# Patient Record
Sex: FEMALE | Race: WHITE | NOT HISPANIC OR LATINO | Employment: STUDENT | URBAN - METROPOLITAN AREA
[De-identification: names, ages, dates, MRNs, and addresses within clinical notes are randomized per-mention and may not be internally consistent; named-entity substitution may affect disease eponyms.]

---

## 2020-09-21 ENCOUNTER — HOSPITAL ENCOUNTER (OUTPATIENT)
Dept: GENERAL RADIOLOGY | Facility: HOSPITAL | Age: 20
Discharge: HOME | End: 2020-09-21
Admitting: FAMILY MEDICINE

## 2020-09-21 ENCOUNTER — OFFICE VISIT (OUTPATIENT)
Dept: FAMILY MEDICINE CLINIC | Facility: CLINIC | Age: 20
End: 2020-09-21

## 2020-09-21 VITALS
RESPIRATION RATE: 15 BRPM | DIASTOLIC BLOOD PRESSURE: 64 MMHG | HEART RATE: 73 BPM | TEMPERATURE: 98.6 F | BODY MASS INDEX: 20.79 KG/M2 | OXYGEN SATURATION: 98 % | SYSTOLIC BLOOD PRESSURE: 112 MMHG | WEIGHT: 137.2 LBS | HEIGHT: 68 IN

## 2020-09-21 DIAGNOSIS — R11.0 NAUSEA: ICD-10-CM

## 2020-09-21 DIAGNOSIS — R19.7 DIARRHEA, UNSPECIFIED TYPE: ICD-10-CM

## 2020-09-21 DIAGNOSIS — R10.30 LOWER ABDOMINAL PAIN: Primary | ICD-10-CM

## 2020-09-21 LAB
B-HCG UR QL: NEGATIVE
BILIRUB BLD-MCNC: NEGATIVE MG/DL
CLARITY, POC: CLEAR
COLOR UR: NORMAL
EXPIRATION DATE: NORMAL
GLUCOSE UR STRIP-MCNC: NEGATIVE MG/DL
INTERNAL NEGATIVE CONTROL: NORMAL
INTERNAL POSITIVE CONTROL: POSITIVE
KETONES UR QL: NEGATIVE
LEUKOCYTE EST, POC: NEGATIVE
Lab: NORMAL
Lab: NORMAL
NITRITE UR-MCNC: NEGATIVE MG/ML
PH UR: 6 [PH] (ref 5–8)
PROT UR STRIP-MCNC: NEGATIVE MG/DL
RBC # UR STRIP: NEGATIVE /UL
SP GR UR: 1.03 (ref 1–1.03)
UROBILINOGEN UR QL: NORMAL

## 2020-09-21 PROCEDURE — 81002 URINALYSIS NONAUTO W/O SCOPE: CPT | Performed by: FAMILY MEDICINE

## 2020-09-21 PROCEDURE — 99203 OFFICE O/P NEW LOW 30 MIN: CPT | Performed by: FAMILY MEDICINE

## 2020-09-21 PROCEDURE — 74018 RADEX ABDOMEN 1 VIEW: CPT

## 2020-09-21 PROCEDURE — 81025 URINE PREGNANCY TEST: CPT | Performed by: FAMILY MEDICINE

## 2020-09-21 NOTE — PATIENT INSTRUCTIONS
Go to the nearest ER or return to clinic if symptoms worsen, fever/chill develop    Abdominal Pain, Adult  Many things can cause belly (abdominal) pain. Most times, belly pain is not dangerous. Many cases of belly pain can be watched and treated at home. Sometimes, though, belly pain is serious. Your doctor will try to find the cause of your belly pain.  Follow these instructions at home:    Medicines  · Take over-the-counter and prescription medicines only as told by your doctor.  · Do not take medicines that help you poop (laxatives) unless told by your doctor.  General instructions  · Watch your belly pain for any changes.  · Drink enough fluid to keep your pee (urine) pale yellow.  · Keep all follow-up visits as told by your doctor. This is important.  Contact a doctor if:  · Your belly pain changes or gets worse.  · You are not hungry, or you lose weight without trying.  · You are having trouble pooping (constipated) or have watery poop (diarrhea) for more than 2-3 days.  · You have pain when you pee or poop.  · Your belly pain wakes you up at night.  · Your pain gets worse with meals, after eating, or with certain foods.  · You are vomiting and cannot keep anything down.  · You have a fever.  · You have blood in your pee.  Get help right away if:  · Your pain does not go away as soon as your doctor says it should.  · You cannot stop vomiting.  · Your pain is only in areas of your belly, such as the right side or the left lower part of the belly.  · You have bloody or black poop, or poop that looks like tar.  · You have very bad pain, cramping, or bloating in your belly.  · You have signs of not having enough fluid or water in your body (dehydration), such as:  ? Dark pee, very little pee, or no pee.  ? Cracked lips.  ? Dry mouth.  ? Sunken eyes.  ? Sleepiness.  ? Weakness.  · You have trouble breathing or chest pain.  Summary  · Many cases of belly pain can be watched and treated at home.  · Watch your belly  pain for any changes.  · Take over-the-counter and prescription medicines only as told by your doctor.  · Contact a doctor if your belly pain changes or gets worse.  · Get help right away if you have very bad pain, cramping, or bloating in your belly.  This information is not intended to replace advice given to you by your health care provider. Make sure you discuss any questions you have with your health care provider.  Document Released: 06/05/2009 Document Revised: 04/27/2020 Document Reviewed: 04/27/2020  Elsevier Patient Education © 2020 [employer]

## 2020-09-21 NOTE — PROGRESS NOTES
Subjective   Savanah Keller is a 20 y.o. female.   Chief Complaint   Patient presents with   • Establish Care     Pt here to establish care, new to the area for [facility]   • Abdominal Pain     she c/o abdominal pain, nausea, diarrhea for several months, she says its happens intermittently and nearly every day     Abdominal Pain  This is a chronic problem. The current episode started more than 1 year ago. The onset quality is gradual. The problem occurs every several days. The problem has been unchanged. The pain is located in the RLQ and LLQ. The quality of the pain is aching, sharp and cramping. The abdominal pain does not radiate. Associated symptoms include diarrhea and nausea. Pertinent negatives include no dysuria, fever or frequency. Nothing aggravates the pain. The pain is relieved by bowel movements. Treatments tried: Pepto Bismol, anti-diarrheal  The treatment provided mild relief.   Doesn't have a BM daily, usually 2-3 days in between. When she does have a BM, it is diarrhea.   Stopped eating dairy, which helped symptoms initially. Reintroduced to her diet, didn't cause symptoms initially. Stopped eating dairy again, but hasn't helped.    Only occasion heartburn, after eating large meal and bending over.   LMP: 9/1/2020    The following portions of the patient's history were reviewed and updated as appropriate: allergies, current medications, past family history, past medical history, past social history, past surgical history and problem list.    Review of Systems   Constitutional: Negative for chills, fatigue and fever.   HENT: Negative.    Eyes: Negative.    Respiratory: Negative for cough, chest tightness and shortness of breath.    Cardiovascular: Negative for chest pain and palpitations.   Gastrointestinal: Positive for abdominal pain, diarrhea and nausea. Negative for blood in stool, GERD and indigestion.   Genitourinary: Negative for dysuria, frequency and urgency.   Skin: Negative for rash.    Neurological: Negative for light-headedness, headache and confusion.   Hematological: Negative for adenopathy. Does not bruise/bleed easily.   Psychiatric/Behavioral: Negative.        Objective   Physical Exam  Vitals signs and nursing note reviewed.   Constitutional:       Appearance: She is well-developed.   HENT:      Head: Normocephalic and atraumatic.      Right Ear: External ear normal.      Left Ear: External ear normal.      Nose: Nose normal.   Eyes:      Conjunctiva/sclera: Conjunctivae normal.   Neck:      Musculoskeletal: Normal range of motion and neck supple.   Cardiovascular:      Rate and Rhythm: Normal rate and regular rhythm.      Heart sounds: Normal heart sounds. No murmur.   Pulmonary:      Effort: Pulmonary effort is normal.      Breath sounds: Normal breath sounds.   Abdominal:      General: Bowel sounds are decreased.      Palpations: Abdomen is soft.      Tenderness: There is no abdominal tenderness. There is no guarding. Negative signs include Saenz's sign and McBurney's sign.   Musculoskeletal:         General: No deformity.   Skin:     General: Skin is warm and dry.   Neurological:      Mental Status: She is alert and oriented to person, place, and time.   Psychiatric:         Mood and Affect: Mood normal.         Behavior: Behavior normal.           Assessment/Plan   aSvanah was seen today for establish care and abdominal pain.    Diagnoses and all orders for this visit:    Lower abdominal pain  -     H. Pylori Breath Test - Breath, Lung  -     POC Urinalysis Dipstick  -     XR Abdomen KUB  -     POCT pregnancy, urine  -     CBC & Differential  -     Comprehensive Metabolic Panel    Diarrhea, unspecified type  -     H. Pylori Breath Test - Breath, Lung  -     POC Urinalysis Dipstick  -     XR Abdomen KUB  -     CBC & Differential  -     Comprehensive Metabolic Panel    Nausea  -     H. Pylori Breath Test - Breath, Lung  -     POC Urinalysis Dipstick  -     XR Abdomen KUB  -     POCT  pregnancy, urine  -     CBC & Differential  -     Comprehensive Metabolic Panel      UPT negative and UA normal.   Labs completed to evaluate abdominal pain, along with KUB.   Consider gastroenterology consult if symptoms persist and evaluation is negative.

## 2020-09-22 LAB
ALBUMIN SERPL-MCNC: 4.7 G/DL (ref 3.5–5.2)
ALBUMIN/GLOB SERPL: 2.2 G/DL
ALP SERPL-CCNC: 62 U/L (ref 39–117)
ALT SERPL-CCNC: 16 U/L (ref 1–33)
AST SERPL-CCNC: 15 U/L (ref 1–32)
BASOPHILS # BLD AUTO: 0.05 10*3/MM3 (ref 0–0.2)
BASOPHILS NFR BLD AUTO: 0.7 % (ref 0–1.5)
BILIRUB SERPL-MCNC: 0.2 MG/DL (ref 0–1.2)
BUN SERPL-MCNC: 11 MG/DL (ref 6–20)
BUN/CREAT SERPL: 14.9 (ref 7–25)
CALCIUM SERPL-MCNC: 9.3 MG/DL (ref 8.6–10.5)
CHLORIDE SERPL-SCNC: 101 MMOL/L (ref 98–107)
CO2 SERPL-SCNC: 26 MMOL/L (ref 22–29)
CREAT SERPL-MCNC: 0.74 MG/DL (ref 0.57–1)
EOSINOPHIL # BLD AUTO: 0.17 10*3/MM3 (ref 0–0.4)
EOSINOPHIL NFR BLD AUTO: 2.5 % (ref 0.3–6.2)
ERYTHROCYTE [DISTWIDTH] IN BLOOD BY AUTOMATED COUNT: 12.9 % (ref 12.3–15.4)
GLOBULIN SER CALC-MCNC: 2.1 GM/DL
GLUCOSE SERPL-MCNC: 82 MG/DL (ref 65–99)
HCT VFR BLD AUTO: 39 % (ref 34–46.6)
HGB BLD-MCNC: 13.4 G/DL (ref 12–15.9)
IMM GRANULOCYTES # BLD AUTO: 0.02 10*3/MM3 (ref 0–0.05)
IMM GRANULOCYTES NFR BLD AUTO: 0.3 % (ref 0–0.5)
LYMPHOCYTES # BLD AUTO: 1.69 10*3/MM3 (ref 0.7–3.1)
LYMPHOCYTES NFR BLD AUTO: 25 % (ref 19.6–45.3)
MCH RBC QN AUTO: 30.7 PG (ref 26.6–33)
MCHC RBC AUTO-ENTMCNC: 34.4 G/DL (ref 31.5–35.7)
MCV RBC AUTO: 89.2 FL (ref 79–97)
MONOCYTES # BLD AUTO: 0.49 10*3/MM3 (ref 0.1–0.9)
MONOCYTES NFR BLD AUTO: 7.2 % (ref 5–12)
NEUTROPHILS # BLD AUTO: 4.35 10*3/MM3 (ref 1.7–7)
NEUTROPHILS NFR BLD AUTO: 64.3 % (ref 42.7–76)
NRBC BLD AUTO-RTO: 0 /100 WBC (ref 0–0.2)
PLATELET # BLD AUTO: 220 10*3/MM3 (ref 140–450)
POTASSIUM SERPL-SCNC: 4.5 MMOL/L (ref 3.5–5.2)
PROT SERPL-MCNC: 6.8 G/DL (ref 6–8.5)
RBC # BLD AUTO: 4.37 10*6/MM3 (ref 3.77–5.28)
SODIUM SERPL-SCNC: 138 MMOL/L (ref 136–145)
UREA BREATH TEST QL: NEGATIVE
WBC # BLD AUTO: 6.77 10*3/MM3 (ref 3.4–10.8)

## 2020-09-22 RX ORDER — POLYETHYLENE GLYCOL 3350 17 G/17G
17 POWDER, FOR SOLUTION ORAL DAILY PRN
Qty: 507 G | Refills: 1 | Status: SHIPPED | OUTPATIENT
Start: 2020-09-22 | End: 2022-06-06

## 2021-02-04 ENCOUNTER — TELEPHONE (OUTPATIENT)
Dept: FAMILY MEDICINE CLINIC | Facility: CLINIC | Age: 21
End: 2021-02-04

## 2021-02-04 DIAGNOSIS — K59.00 CONSTIPATION, UNSPECIFIED CONSTIPATION TYPE: ICD-10-CM

## 2021-02-04 DIAGNOSIS — R10.30 LOWER ABDOMINAL PAIN: Primary | ICD-10-CM

## 2021-02-04 NOTE — TELEPHONE ENCOUNTER
Patient says her GI issue is not getting better. She states the Provider told her to call if not better and the provider would order a referral to GI for appt.     Please advise her.

## 2021-04-21 ENCOUNTER — OFFICE VISIT (OUTPATIENT)
Dept: GASTROENTEROLOGY | Facility: CLINIC | Age: 21
End: 2021-04-21

## 2021-04-21 VITALS
DIASTOLIC BLOOD PRESSURE: 77 MMHG | SYSTOLIC BLOOD PRESSURE: 115 MMHG | TEMPERATURE: 98.4 F | BODY MASS INDEX: 20.65 KG/M2 | HEART RATE: 68 BPM | WEIGHT: 135.8 LBS

## 2021-04-21 DIAGNOSIS — K58.2 IRRITABLE BOWEL SYNDROME WITH BOTH CONSTIPATION AND DIARRHEA: Primary | ICD-10-CM

## 2021-04-21 PROCEDURE — 99244 OFF/OP CNSLTJ NEW/EST MOD 40: CPT | Performed by: INTERNAL MEDICINE

## 2021-04-21 RX ORDER — MONTELUKAST SODIUM 10 MG/1
1 TABLET ORAL DAILY
COMMUNITY
Start: 2021-01-26 | End: 2022-06-06 | Stop reason: SDUPTHER

## 2021-04-21 NOTE — PROGRESS NOTES
GASTROENTEROLOGY OFFICE NOTE  Savanah Keller  [id number]  2000        CARE TEAM  Patient Care Team:  Aleyda Sinha DO as PCP - General (Family Medicine)    Aleyda Sinha DO     Chief Complaint   Patient presents with   • Abdominal Pain   • Constipation   • Diarrhea   • Heartburn        HISTORY OF PRESENT ILLNESS:  First visit for this very pleasant 21-year-old white female primarily here today to address longstanding problems with abdominal pain.    Patient states that she is have lower abdominal pain which occurs on most days.  It is moderate in intensity and can last for perhaps an hour at a time.  She cannot identify any factors that consistently exacerbate this pain nor any that consistently improve it.  It is not really associated with nocturnal awakening except for on some very rare occasions.  She denies dysphagia to solids or diet aphasia she denies early satiety or unexplained weight loss she has not had any problems with melena nor bright red blood per rectum but she does admit to constipation with [place] scale type III stools on most occasions occasionally type I.  She has infrequent bowel movements she can go for days without a bowel movement during which time it seems that her symptoms of lower abdominal discomfort exacerbated.  It seems to be somewhat better perhaps when she does start having bowel movements but then she will have diarrhea.    Incidentally she has hyper elasticity but apparently has not been formally diagnosed with Ehler Danlos syndrome.    She is here today to further address her ongoing problems primarily out of constipation and lower abdominal pain.    PAST MEDICAL HISTORY  History reviewed. No pertinent past medical history.     PAST SURGICAL HISTORY  Past Surgical History:   Procedure Laterality Date   • WISDOM TOOTH EXTRACTION          MEDICATIONS:    Current Outpatient Medications:   •  montelukast (SINGULAIR) 10 MG tablet, Take 1 tablet by mouth Daily., Disp:  , Rfl:   •  polyethylene glycol (MiraLax) 17 GM/SCOOP powder, Take 17 g by mouth Daily As Needed (constipation)., Disp: 507 g, Rfl: 1    ALLERGIES  Allergies   Allergen Reactions   • Nsaids Swelling   • Penicillins Rash       FAMILY HISTORY:  Family History   Problem Relation Age of Onset   • Diabetes Mother    • Migraines Mother    • Diabetes Father    • Hyperlipidemia Father        SOCIAL HISTORY  Social History     Socioeconomic History   • Marital status: Single     Spouse name: Not on file   • Number of children: Not on file   • Years of education: Not on file   • Highest education level: Not on file   Tobacco Use   • Smoking status: Never Smoker   • Smokeless tobacco: Never Used   Vaping Use   • Vaping Use: Never used   Substance and Sexual Activity   • Alcohol use: Yes     Comment: 1 time a month    • Drug use: Never     Socioeconomic History:  She is single and is attending [email or web address] and studying biology and psychology.  She is a non-smoker, nondrinker and does not have any children.       REVIEW OF SYSTEMS  Review of Systems   Constitutional: Negative for activity change, appetite change, chills, diaphoresis, fatigue, fever, unexpected weight gain and unexpected weight loss.   HENT: Negative for congestion, dental problem, drooling, ear discharge, ear pain, facial swelling, hearing loss, mouth sores, nosebleeds, postnasal drip, rhinorrhea, sinus pressure, sneezing, sore throat, swollen glands, tinnitus, trouble swallowing and voice change.    Respiratory: Negative for apnea, cough, choking, chest tightness, shortness of breath, wheezing and stridor.    Cardiovascular: Negative for chest pain, palpitations and leg swelling.   Gastrointestinal: Positive for abdominal distention, abdominal pain, constipation, diarrhea, nausea and GERD. Negative for anal bleeding, blood in stool, rectal pain, vomiting and indigestion.   Endocrine: Negative for cold intolerance, heat intolerance, polydipsia, polyphagia  and polyuria.   Musculoskeletal: Negative for arthralgias, back pain, gait problem, joint swelling, myalgias, neck pain, neck stiffness and bursitis.   Allergic/Immunologic: Negative for environmental allergies, food allergies and immunocompromised state.   Neurological: Negative for dizziness, tremors, seizures, facial asymmetry, speech difficulty, weakness, light-headedness, numbness and confusion.   Hematological: Negative for adenopathy. Does not bruise/bleed easily.   Psychiatric/Behavioral: Negative for agitation, behavioral problems, decreased concentration, dysphoric mood, hallucinations, self-injury, sleep disturbance, suicidal ideas, negative for hyperactivity, depressed mood and stress. The patient is not nervous/anxious.      I have reviewed the above-noted review of systems.    PHYSICAL EXAM   /77 (BP Location: Left arm, Patient Position: Sitting, Cuff Size: Adult)   Pulse 68   Temp 98.4 °F (36.9 °C) (Temporal)   Wt 61.6 kg (135 lb 12.8 oz)   BMI 20.65 kg/m²   General: Alert and oriented x 3. In no apparent or acute distress.  and No stigmata of chronic liver disease  HEENT: Anicteric sclerae. Normal oropharynx  Neck: Supple. Without lymphadenopathy  CV: Regular rate and rhythm, S1, S2  Lungs: Clear to ausculation. Without rales, rhonchi and wheezing  Abdomen:  Soft,non-distended without palpable masses or hepatosplenomeagaly, areas of rebound tenderness or guarding.   Extremeties: without clubbing, cyanosis or edema  Neurologic:  Alert and oriented x 3 without focal motor or sensory deficits  Rectal exam: deferred        Results Review:  I reviewed the patient's new clinical results.  Recent CBC, H. pylori breath test reviewed all within normal limits.      ASSESSMENT  1.-Lower abdominal pain which seems to be related to constipation predominant irritable bowel syndrome.  Her diarrhea seems to only occur once her constipation resolves.  She does not exhibit any alarm symptoms and denies  abnormal weight loss, nocturnal awakening, hematochezia.  Suspicion for inflammatory bowel disease or celiac disease remains very low.  Conservative management initially recommended.    PLAN  1.-High-fiber diet of at least 30 g/day.  We reviewed with her how to achieve this goal and have asked her to also add MiraLAX 1 dose daily and titrate up this dietary fiber and MiraLAX to tolerance and response.  We will see her back in about 8 weeks and make further recommendations at that time but encouraged her to call us in the meantime for further recommendations.  I would like to avoid, at least initially pharmacological therapy for constipation predominant irritable bowel syndrome and she is certainly agreeable to this initially conservative approach.  At this time colonoscopy or upper endoscopy are unlikely to add to her management.    Cristiano Gill MD  4/21/2021   14:15 EDT

## 2021-07-01 ENCOUNTER — TELEPHONE (OUTPATIENT)
Dept: GASTROENTEROLOGY | Facility: CLINIC | Age: 21
End: 2021-07-01

## 2021-07-01 NOTE — TELEPHONE ENCOUNTER
Patient called because she is currently in [place] and for two weeks was unable to do your recommended regimen of increased fiber and miralax . The patient has now started taking the Miralax but is concerned about starting with the fiber recommendations as she has been really sick for a week or 2 with spending hours in the bathroom  trying to have a bowel movement  and has abdominal pain. Per the patient she goes roughly every 2 days, has noticed bright red blood in her stool, and the abdominal pain usually starts after a few bites of food. Confirmed I would notify  of this information and call her back with further recommendations.

## 2021-08-25 ENCOUNTER — TELEMEDICINE (OUTPATIENT)
Dept: GASTROENTEROLOGY | Facility: CLINIC | Age: 21
End: 2021-08-25

## 2021-08-25 VITALS
BODY MASS INDEX: 19.86 KG/M2 | DIASTOLIC BLOOD PRESSURE: 65 MMHG | HEART RATE: 77 BPM | WEIGHT: 130.6 LBS | SYSTOLIC BLOOD PRESSURE: 112 MMHG | TEMPERATURE: 97.7 F

## 2021-08-25 DIAGNOSIS — K58.1 IRRITABLE BOWEL SYNDROME WITH CONSTIPATION: Primary | ICD-10-CM

## 2021-08-25 DIAGNOSIS — R10.84 GENERALIZED ABDOMINAL PAIN: ICD-10-CM

## 2021-08-25 PROCEDURE — 99214 OFFICE O/P EST MOD 30 MIN: CPT | Performed by: INTERNAL MEDICINE

## 2021-08-25 NOTE — PROGRESS NOTES
GASTROENTEROLOGY OFFICE NOTE  Savanah Keller  [id number]  2000        Chief Complaint   Patient presents with   • Follow-up   • Irritable Bowel Syndrome   • Constipation   • Diarrhea        HISTORY OF PRESENT ILLNESS:  Patient presents for follow-up.  She has chronic idiopathic constipation/constipation predominant irritable bowel syndrome.  When we saw her initially we recommend a high-fiber diet daily MiraLAX and she did indeed implement this regimen with an improvement in her bowel habits having now bowel movements about every 1 or 2 days but she still notices some straining.  The straining is also improved as has her frequency but she feels that she still feels bloated and distended and some abdominal discomfort that would be improved by normalizing her bowel habits further.    She does not have any other localizing GI complaints at this time she has Ehler Danlos syndrome.    PAST MEDICAL HISTORY  Mike-Danlos syndrome  Renal bowel syndrome  Constipation      PAST SURGICAL HISTORY  Past Surgical History:   Procedure Laterality Date   • WISDOM TOOTH EXTRACTION          MEDICATIONS:    Current Outpatient Medications:   •  montelukast (SINGULAIR) 10 MG tablet, Take 1 tablet by mouth Daily., Disp: , Rfl:   •  polyethylene glycol (MiraLax) 17 GM/SCOOP powder, Take 17 g by mouth Daily As Needed (constipation)., Disp: 507 g, Rfl: 1    ALLERGIES  Allergies   Allergen Reactions   • Nsaids Swelling   • Penicillins Rash       FAMILY HISTORY:  Family History   Problem Relation Age of Onset   • Diabetes Mother    • Migraines Mother    • Diabetes Father    • Hyperlipidemia Father        SOCIAL HISTORY  Social History     Socioeconomic History   • Marital status: Single     Spouse name: Not on file   • Number of children: Not on file   • Years of education: Not on file   • Highest education level: Not on file   Tobacco Use   • Smoking status: Never Smoker   • Smokeless tobacco: Never Used   Vaping Use   • Vaping Use:  Never used   Substance and Sexual Activity   • Alcohol use: Yes     Comment: 1 time a month    • Drug use: Never     Socioeconomic History:  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  She is back at [employer] and is looking to graduate next year and then pursue a PhD degree.  She is a non-smoker/nondrinker.  She does not have any children.  She is currently studying biology/psychology       REVIEW OF SYSTEMS  Review of Systems   Constitutional: Positive for appetite change, fatigue and unexpected weight loss. Negative for activity change, chills, diaphoresis, fever and unexpected weight gain.   HENT: Negative for trouble swallowing and voice change.    Gastrointestinal: Negative for abdominal distention, abdominal pain, anal bleeding, blood in stool, constipation, diarrhea, nausea, rectal pain, vomiting, GERD and indigestion.     I reviewed the above-noted review of systems.  Pertinent/active issues are those noted in today's HPI.    PHYSICAL EXAM   There were no vitals taken for this visit.  General: Alert and oriented x 3. In no apparent or acute distress.  and No stigmata of chronic liver disease  HEENT: Wearing facemask.  Anicteric sclera  Neck: Supple. Without lymphadenopathy  CV: Regular rate and rhythm, S1, S2  Lungs: Clear to ausculation. Without rales, rhonchi and wheezing  Abdomen:  Soft,non-distended without palpable masses or hepatosplenomeagaly, areas of rebound tenderness or guarding.   Extremeties: without clubbing, cyanosis or edema  Neurologic:  Alert and oriented x 3 without focal motor or sensory deficits    ASSESSMENT  1.-Constipation predominant early bowel syndrome/chronic idiopathic constipation.  Unresolved with conservative measures including daily MiraLAX and daily fiber intake greater than 20 g/day.  Add Linzess 290 mcg daily titrated response and tolerance  2.-Mike-Danlos syndrome  3.-Low abdominal pain.  Improved with improvement of her bowel  habits.    PLAN  1.-Continue high-fiber diet  2.-Continue MiraLAX  3.-Add Linzess/linaclotide 290 mcg daily  4.-Follow-up appointment 4 months  5.-Patient will keep us posted on her progress so we can make further modifications to her medical regimen as needed pending her response to linaclotide      Cristiano Gill MD  8/25/2021   15:50 EDT

## 2022-06-06 ENCOUNTER — OFFICE VISIT (OUTPATIENT)
Dept: FAMILY MEDICINE CLINIC | Facility: CLINIC | Age: 22
End: 2022-06-06

## 2022-06-06 VITALS
OXYGEN SATURATION: 99 % | HEART RATE: 68 BPM | SYSTOLIC BLOOD PRESSURE: 104 MMHG | RESPIRATION RATE: 16 BRPM | HEIGHT: 68 IN | DIASTOLIC BLOOD PRESSURE: 58 MMHG | TEMPERATURE: 98.9 F | WEIGHT: 131 LBS | BODY MASS INDEX: 19.85 KG/M2

## 2022-06-06 DIAGNOSIS — Z30.011 ENCOUNTER FOR INITIAL PRESCRIPTION OF CONTRACEPTIVE PILLS: Primary | ICD-10-CM

## 2022-06-06 DIAGNOSIS — J30.2 SEASONAL ALLERGIES: ICD-10-CM

## 2022-06-06 DIAGNOSIS — L70.0 ACNE VULGARIS: ICD-10-CM

## 2022-06-06 DIAGNOSIS — J45.990 EXERCISE-INDUCED ASTHMA: ICD-10-CM

## 2022-06-06 PROCEDURE — 99214 OFFICE O/P EST MOD 30 MIN: CPT | Performed by: PHYSICIAN ASSISTANT

## 2022-06-06 RX ORDER — ADAPALENE 0.1 G/100G
1 CREAM TOPICAL NIGHTLY
Qty: 45 G | Refills: 5 | Status: SHIPPED | OUTPATIENT
Start: 2022-06-06 | End: 2022-09-22

## 2022-06-06 RX ORDER — MONTELUKAST SODIUM 10 MG/1
10 TABLET ORAL NIGHTLY
Qty: 90 TABLET | Refills: 3 | Status: SHIPPED | OUTPATIENT
Start: 2022-06-06

## 2022-06-06 RX ORDER — ALBUTEROL SULFATE 90 UG/1
2 AEROSOL, METERED RESPIRATORY (INHALATION) EVERY 4 HOURS PRN
Qty: 8 G | Refills: 5 | Status: SHIPPED | OUTPATIENT
Start: 2022-06-06

## 2022-06-06 RX ORDER — CLINDAMYCIN PHOSPHATE 10 MG/G
1 GEL TOPICAL 2 TIMES DAILY
Qty: 60 G | Refills: 5 | Status: SHIPPED | OUTPATIENT
Start: 2022-06-06 | End: 2022-09-22

## 2022-06-06 RX ORDER — DROSPIRENONE, ETHINYL ESTRADIOL AND LEVOMEFOLATE CALCIUM AND LEVOMEFOLATE CALCIUM 3-0.02(24)
1 KIT ORAL DAILY
Qty: 28 TABLET | Refills: 11 | Status: SHIPPED | OUTPATIENT
Start: 2022-06-06

## 2022-06-06 RX ORDER — NORGESTIMATE AND ETHINYL ESTRADIOL 7DAYSX3 28
KIT ORAL
COMMUNITY
Start: 2022-05-14 | End: 2022-06-06

## 2022-06-06 NOTE — PROGRESS NOTES
"Marlo Keller is a 22 y.o. female.     History of Present Illness   F/u for allergies and exercise induced asthma. Needs refills on Singulair and albuterol inhaler   Was seeing PA at [facility] for OCP. Helping with cycles but noticing acne has gotten worse. Requesting to try different OCP. Currently on cycle. No chance of pregnancy. Same sex partner.   No additional concerns at this time     The following portions of the patient's history were reviewed and updated as appropriate: allergies, current medications, past family history, past medical history, past social history, past surgical history and problem list.    Review of Systems   Skin:        Acne        Objective    Blood pressure 104/58, pulse 68, temperature 98.9 °F (37.2 °C), resp. rate 16, height 172.7 cm (68\"), weight 59.4 kg (131 lb), SpO2 99 %.     Physical Exam  Vitals and nursing note reviewed.   Constitutional:       Appearance: Normal appearance.   HENT:      Head: Normocephalic.      Right Ear: External ear normal.      Left Ear: External ear normal.      Nose: Nose normal.   Eyes:      Conjunctiva/sclera: Conjunctivae normal.   Cardiovascular:      Rate and Rhythm: Normal rate and regular rhythm.   Pulmonary:      Effort: Pulmonary effort is normal. No respiratory distress.      Breath sounds: Normal breath sounds. No wheezing or rhonchi.   Skin:     General: Skin is warm and dry.      Comments: Erythematous papules and pustules on face diffusely    Neurological:      Mental Status: She is alert and oriented to person, place, and time.   Psychiatric:         Mood and Affect: Mood normal.         Behavior: Behavior normal.         Thought Content: Thought content normal.         Judgment: Judgment normal.         Assessment & Plan   Diagnoses and all orders for this visit:    1. Encounter for initial prescription of contraceptive pills (Primary)  -     Drospiren-Eth Estrad-Levomefol 3-0.02-0.451 MG tablet; Take 1 tablet by " mouth Daily.  Dispense: 28 tablet; Refill: 11    2. Seasonal allergies  -     montelukast (SINGULAIR) 10 MG tablet; Take 1 tablet by mouth Every Night.  Dispense: 90 tablet; Refill: 3    3. Exercise-induced asthma  -     albuterol sulfate  (90 Base) MCG/ACT inhaler; Inhale 2 puffs Every 4 (Four) Hours As Needed for Wheezing or Shortness of Air.  Dispense: 8 g; Refill: 5    4. Acne vulgaris  -     adapalene (Differin) 0.1 % cream; Apply 1 application topically to the appropriate area as directed Every Night.  Dispense: 45 g; Refill: 5  -     clindamycin (Clindagel) 1 % gel; Apply 1 application topically to the appropriate area as directed 2 (Two) Times a Day.  Dispense: 60 g; Refill: 5      [facility] OCP to help with cycle regulation and acne. Additional topical acne treatment with Differin and clindagel as noted.   Refills on singulair and albuterol provided.   F/u with PCP as directed.

## 2022-09-13 ENCOUNTER — E-VISIT (OUTPATIENT)
Dept: ADMINISTRATIVE | Facility: OTHER | Age: 22
End: 2022-09-13

## 2022-09-13 NOTE — E-VISIT ESCALATED
Chief Complaint: Coronavirus (COVID-19), cold, sinus pain, allergy, or flu   Patient was shown the following escalation message:   Some conditions need a visit with a healthcare provider   When you have trouble swallowing, it could mean you have a blocked airway. You should speak with a provider to get care.   If you start to have trouble breathing, go directly to the nearest emergency room.   Otherwise, select an option below.   ----------   Patient Interview Transcript:   Why are you getting care through eVisit today? We can't guarantee a specific treatment or test. Your provider will decide what's best for you. Select all that apply.    I want to know if I have a cold or something more serious    I want to know if I need to be seen by a provider    I just want to feel better!   Not selected:    I need a doctor's note    I want to be tested for COVID-19    I want to get the COVID-19 vaccine    I think I'm having side effects from the COVID-19 vaccine    None of the above   COVID-19 symptoms are similar to symptoms of other illnesses. This makes it difficult to diagnose. Please carefully consider each question and answer as best you can. This will help your provider make the right diagnosis. Which of these symptoms are bothering you? Select all that apply.    Itchy or watery eyes    Sore throat   Not selected:    Cough    Shortness of breath    Fever    Stuffed-up nose or sinuses    Runny nose    Itchy nose or sneezing    Loss of smell or taste    Hoarse voice or loss of voice    Headache    Sweats    Chills    Muscle or body aches    Fatigue or tiredness    Nausea or vomiting    Diarrhea    I don't have any of these symptoms   Do you have any of these conditions? These conditions can put you at increased risk for severe disease if you're infected with COVID-19. Select all that apply.    None of the above   Not selected:    Chronic lung disease, such as cystic fibrosis or interstitial fibrosis    Heart disease,  such as congenital heart disease, congestive heart failure, or coronary artery disease    Disorder of the brain, spinal cord, or nerves and muscles, such as dementia, cerebral palsy, epilepsy, muscular dystrophy, or developmental delay    Metabolic disorder or mitochondrial disease    Cerebrovascular disease, such as stroke or another condition affecting the blood vessels or blood supply to the brain    Down syndrome    Mood disorder, including depression or schizophrenia spectrum disorders    Substance use disorder, such as alcohol, opioid, or cocaine use disorder    Tuberculosis   Do you live in a group care setting? Examples include: - Nursing home - Residential care - Psychiatric treatment facility - Group home - [facility] - [facility] and care home - Homeless shelter - Foster care setting Select one.    No   Not selected:    Yes   Have you ever been tested for COVID-19? Select one.    Yes   Not selected:    No   When was your most recent COVID-19 test? Select one.    7 to 14 days ago   Not selected:    Within the last week (specify date as MM/DD/YY)    15 to 30 days ago    1 to 3 months ago    More than 3 months ago   What type of COVID-19 test did you most recently have? There are two types of COVID-19 tests: - Viral tests check if you're currently infected with COVID-19. For these tests, a nose swab or saliva sample is taken. Viral tests include self-tests and tests done at a doctor's office, lab, or testing site. - Antibody tests check if you've been infected in the past. For these tests, your blood is drawn. Antibody tests can only be done at a doctor's office, lab, or testing site. Select one.    Viral self-test   Not selected:    Viral test at a doctor's office, lab, or testing site    Antibody test   What was the result of your most recent COVID-19 test? Select one.    Negative   Not selected:    Positive    I'm not sure   Have you gotten the COVID-19 vaccine? Select one.    Yes   Not selected:    No   How  "many doses of the COVID-19 vaccine have you gotten? This includes boosters as well as third or fourth doses for those who are immunocompromised. Select one.    3 doses   Not selected:    1 dose    2 doses    4 doses   1st dose    Moderna   Not selected:    J&J/Higinio    Novavax    Pfizer   2nd dose    Moderna   Not selected:    J&J/Higinio    Novavax    Pfizer   3rd dose    Moderna   Not selected:    J&J/Higinio    Novavax    Pfizer   When did you get your most recent dose of the COVID-19 vaccine?    More than 14 days ago   Not selected:    Less than 48 hours (2 days) ago    48 to 72 hours (3 days) ago    3 to 5 days ago    5 to 7 days ago    7 to 14 days ago   In the last 14 days, have you traveled outside of your local community? This includes travel by car, RV, bus, train, or plane. Travel increases your chances of getting and spreading COVID-19. Select one.    No   Not selected:    Yes   In the last 14 days, have you had close contact with someone who has coronavirus (COVID-19)? \"Close contact\" means any of these: - Living in the same household as someone with COVID-19. - Caring for someone with COVID-19. - Being within 6 feet of someone with COVID-19 for a total of at least 15 minutes over a 24-hour period. For example, three 5-minute exposures for a total of 15 minutes. - Being in direct contact with respiratory droplets from someone with COVID-19 (being coughed on, kissing, sharing utensils). Select one.    Yes, a confirmed case   Not selected:    Yes, a suspected case    No, not that I know of   When did the close contact happen? Select one.    More than 7 days ago   Not selected:    Within the last 2 days    3 to 7 days ago   When did your current symptoms start? Select one.    2 to 4 weeks ago   Not selected:    Less than 48 hours ago    3 to 5 days ago    6 to 9 days ago    10 to 14 days ago    More than a month ago   Did your symptoms come on suddenly or gradually? Select one.    Gradually   Not " selected:    Suddenly    I'm not sure   Have your symptoms improved at all since they began? Select one.    Yes, but then they came back worse than before   Not selected:    Yes, but they haven't gone away completely    No    I'm not sure   It sounds like you felt better, but now you feel sick again. How long did you feel better? Select one.    1 to 2 days   Not selected:    Less than 1 day    More than 2 days    I'm not sure   Can you swallow liquids and solid foods? A sore throat may be painful when swallowing, but it shouldn't prevent you from swallowing. Select one.    It's hard to swallow anything because it feels like liquids and food get stuck in my throat   Not selected:    Yes, with ease    Yes, but it's uncomfortable    Yes, but it's painful    No, I can't swallow anything, liquid or solid foods   ----------   Medical history   Medical history data does not currently exist for this patient.

## 2022-09-22 ENCOUNTER — OFFICE VISIT (OUTPATIENT)
Dept: FAMILY MEDICINE CLINIC | Facility: CLINIC | Age: 22
End: 2022-09-22

## 2022-09-22 VITALS
DIASTOLIC BLOOD PRESSURE: 66 MMHG | HEART RATE: 69 BPM | SYSTOLIC BLOOD PRESSURE: 114 MMHG | BODY MASS INDEX: 19.48 KG/M2 | TEMPERATURE: 97.5 F | HEIGHT: 68 IN | RESPIRATION RATE: 18 BRPM | OXYGEN SATURATION: 99 % | WEIGHT: 128.5 LBS

## 2022-09-22 DIAGNOSIS — J31.2 SORE THROAT, CHRONIC: ICD-10-CM

## 2022-09-22 DIAGNOSIS — B00.1 FEVER BLISTER: ICD-10-CM

## 2022-09-22 DIAGNOSIS — Z91.09 ENVIRONMENTAL ALLERGIES: Primary | ICD-10-CM

## 2022-09-22 PROCEDURE — 99213 OFFICE O/P EST LOW 20 MIN: CPT | Performed by: PHYSICIAN ASSISTANT

## 2022-09-22 RX ORDER — PREDNISONE 20 MG/1
20 TABLET ORAL 2 TIMES DAILY
Qty: 10 TABLET | Refills: 0 | Status: SHIPPED | OUTPATIENT
Start: 2022-09-22 | End: 2022-10-10

## 2022-09-22 RX ORDER — VALACYCLOVIR HYDROCHLORIDE 1 G/1
2000 TABLET, FILM COATED ORAL 2 TIMES DAILY
Qty: 4 TABLET | Refills: 5 | Status: SHIPPED | OUTPATIENT
Start: 2022-09-22 | End: 2022-09-23

## 2022-09-22 RX ORDER — FAMOTIDINE 20 MG/1
20 TABLET, FILM COATED ORAL 2 TIMES DAILY
Qty: 60 TABLET | Refills: 0 | Status: SHIPPED | OUTPATIENT
Start: 2022-09-22

## 2022-09-22 NOTE — PROGRESS NOTES
"Subjective   Savanah Keller is a 22 y.o. female.     History of Present Illness   Patient presents with persistent sore throat and drainage for the last several weeks.  Notes recently got a new longhaired cat.  Concerned she is allergic.  Patient does not like cat sleep in bedroom with her.  Has air purifier in the living room of the cat spends most of his time in.  Does have history of seasonal allergies.  Currently taking Singulair.  Utilizing albuterol inhaler when needed.  No history of formal allergy testing.  Patient is interested in obtaining.  Occasional heartburn.  Has not been taking antihistamine.  Does have sneezing and runny nose.    Requesting Rx for fever blister.  Does not have one currently but will get about once a month or so    The following portions of the patient's history were reviewed and updated as appropriate: allergies, current medications, past family history, past medical history, past social history, past surgical history and problem list.    Review of Systems  As noted per HPI    Objective    Blood pressure 114/66, pulse 69, temperature 97.5 °F (36.4 °C), resp. rate 18, height 172.7 cm (68\"), weight 58.3 kg (128 lb 8 oz), SpO2 99 %.     Physical Exam  Vitals and nursing note reviewed.   Constitutional:       Appearance: Normal appearance.   HENT:      Head: Normocephalic.      Right Ear: Tympanic membrane, ear canal and external ear normal.      Left Ear: Tympanic membrane, ear canal and external ear normal.      Nose: Nose normal.      Mouth/Throat:      Pharynx: No oropharyngeal exudate or posterior oropharyngeal erythema.   Eyes:      Conjunctiva/sclera: Conjunctivae normal.   Cardiovascular:      Rate and Rhythm: Normal rate and regular rhythm.      Heart sounds: Normal heart sounds.   Pulmonary:      Effort: Pulmonary effort is normal. No respiratory distress.      Breath sounds: Normal breath sounds. No wheezing or rhonchi.   Skin:     General: Skin is warm and dry. "   Neurological:      Mental Status: She is alert and oriented to person, place, and time.   Psychiatric:         Mood and Affect: Mood normal.         Behavior: Behavior normal.         Thought Content: Thought content normal.         Judgment: Judgment normal.         Assessment & Plan   Diagnoses and all orders for this visit:    1. Environmental allergies (Primary)  -     predniSONE (DELTASONE) 20 MG tablet; Take 1 tablet by mouth 2 (Two) Times a Day.  Dispense: 10 tablet; Refill: 0  -     Ambulatory Referral to Allergy    2. Sore throat, chronic  -     famotidine (Pepcid) 20 MG tablet; Take 1 tablet by mouth 2 (Two) Times a Day.  Dispense: 60 tablet; Refill: 0    3. Fever blister  -     valACYclovir (Valtrex) 1000 MG tablet; Take 2 tablets by mouth 2 (Two) Times a Day for 1 day.  Dispense: 4 tablet; Refill: 5      Steroid burst and trial of Pepcid for symptoms.  Referral to allergy for additional testing.  Valtrex sent into pharmacy for fever blisters.

## 2022-10-10 ENCOUNTER — OFFICE VISIT (OUTPATIENT)
Dept: FAMILY MEDICINE CLINIC | Facility: CLINIC | Age: 22
End: 2022-10-10

## 2022-10-10 VITALS
DIASTOLIC BLOOD PRESSURE: 80 MMHG | BODY MASS INDEX: 19.43 KG/M2 | HEIGHT: 68 IN | HEART RATE: 92 BPM | TEMPERATURE: 97.8 F | WEIGHT: 128.2 LBS | SYSTOLIC BLOOD PRESSURE: 120 MMHG | RESPIRATION RATE: 20 BRPM | OXYGEN SATURATION: 99 %

## 2022-10-10 DIAGNOSIS — R05.1 ACUTE COUGH: ICD-10-CM

## 2022-10-10 DIAGNOSIS — T78.40XA ALLERGIC RASH PRESENT ON EXAMINATION: ICD-10-CM

## 2022-10-10 DIAGNOSIS — J06.9 ACUTE URI: Primary | ICD-10-CM

## 2022-10-10 LAB
EXPIRATION DATE: NORMAL
FLUAV AG UPPER RESP QL IA.RAPID: NOT DETECTED
FLUBV AG UPPER RESP QL IA.RAPID: NOT DETECTED
INTERNAL CONTROL: NORMAL
Lab: NORMAL
SARS-COV-2 AG UPPER RESP QL IA.RAPID: NOT DETECTED

## 2022-10-10 PROCEDURE — 99213 OFFICE O/P EST LOW 20 MIN: CPT | Performed by: FAMILY MEDICINE

## 2022-10-10 PROCEDURE — 87428 SARSCOV & INF VIR A&B AG IA: CPT | Performed by: FAMILY MEDICINE

## 2022-10-10 NOTE — PROGRESS NOTES
"Chief Complaint   Patient presents with   • sinus congestion      Started yesterday    • Rash     Started several days ago on chest area        Subjective      Savanah Keller is a 22 y.o. who presents for approximately 2 days of URI symptoms after traveling.  Patient mitts she \"just feels bad\".  She also has developed a rash on her chest over the last 3 to 4 days.  Patient has known allergies and will be undergoing allergy testing in approximately 2 weeks.  While she denies change in soaps or detergents she did stay at a relatives house at the time of onset of rash    Objective   Vital Signs:  /80   Pulse 92   Temp 97.8 °F (36.6 °C)   Resp 20   Ht 172.7 cm (67.99\")   Wt 58.2 kg (128 lb 3.2 oz)   SpO2 99%   BMI 19.50 kg/m²     Physical Exam  Constitutional:       Appearance: Normal appearance.   HENT:      Head: Normocephalic and atraumatic.      Right Ear: Tympanic membrane and ear canal normal.      Left Ear: Tympanic membrane and ear canal normal.      Nose: Nose normal.      Mouth/Throat:      Mouth: Mucous membranes are moist.      Pharynx: Oropharynx is clear.   Eyes:      Conjunctiva/sclera: Conjunctivae normal.   Cardiovascular:      Rate and Rhythm: Normal rate and regular rhythm.      Heart sounds: Normal heart sounds. No murmur heard.  Pulmonary:      Effort: Pulmonary effort is normal. No respiratory distress.      Breath sounds: Normal breath sounds.   Musculoskeletal:      Cervical back: Normal range of motion and neck supple. No tenderness.   Lymphadenopathy:      Cervical: No cervical adenopathy.   Skin:     General: Skin is warm and dry.   Neurological:      Mental Status: She is alert.   Psychiatric:         Mood and Affect: Mood normal.          Result Review                     Assessment and Plan  Diagnoses and all orders for this visit:    1. Acute URI (Primary)  Comments:  Use ibuprofen for myalgias or throat pain.  Continue antihistamines    2. Acute cough  -     POCT SARS-CoV-2 " Antigen ERICKA    3. Allergic rash present on examination  Comments:  Continue antihistamines    Other orders  -     Chlorcyclizine-Pseudoephed 25-60 MG tablet; Take 1 tablet by mouth 2 (Two) Times a Day.  Dispense: 42 tablet; Refill: 0            Follow Up  No follow-ups on file.  Patient was given instructions and counseling regarding her condition or for health maintenance advice. Please see specific information pulled into the AVS if appropriate.

## 2022-10-12 ENCOUNTER — OFFICE VISIT (OUTPATIENT)
Dept: FAMILY MEDICINE CLINIC | Facility: CLINIC | Age: 22
End: 2022-10-12

## 2022-10-12 VITALS
WEIGHT: 130 LBS | HEIGHT: 68 IN | OXYGEN SATURATION: 99 % | TEMPERATURE: 97.7 F | RESPIRATION RATE: 18 BRPM | BODY MASS INDEX: 19.7 KG/M2

## 2022-10-12 DIAGNOSIS — R25.1 SHAKINESS: ICD-10-CM

## 2022-10-12 DIAGNOSIS — R42 DIZZINESS: Primary | ICD-10-CM

## 2022-10-12 DIAGNOSIS — E55.9 VITAMIN D DEFICIENCY: ICD-10-CM

## 2022-10-12 DIAGNOSIS — R45.86 MOOD SWINGS: ICD-10-CM

## 2022-10-12 DIAGNOSIS — R53.82 CHRONIC FATIGUE: ICD-10-CM

## 2022-10-12 LAB
B-HCG UR QL: NEGATIVE
BILIRUB BLD-MCNC: NEGATIVE MG/DL
CLARITY, POC: CLEAR
COLOR UR: YELLOW
EXPIRATION DATE: NORMAL
EXPIRATION DATE: NORMAL
GLUCOSE UR STRIP-MCNC: NEGATIVE MG/DL
INTERNAL NEGATIVE CONTROL: NEGATIVE
INTERNAL POSITIVE CONTROL: POSITIVE
KETONES UR QL: NEGATIVE
LEUKOCYTE EST, POC: NEGATIVE
Lab: NORMAL
Lab: NORMAL
NITRITE UR-MCNC: NEGATIVE MG/ML
PH UR: 6 [PH] (ref 5–8)
PROT UR STRIP-MCNC: NEGATIVE MG/DL
RBC # UR STRIP: NEGATIVE /UL
SP GR UR: 1 (ref 1–1.03)
UROBILINOGEN UR QL: NORMAL

## 2022-10-12 PROCEDURE — 81025 URINE PREGNANCY TEST: CPT | Performed by: PHYSICIAN ASSISTANT

## 2022-10-12 PROCEDURE — 93000 ELECTROCARDIOGRAM COMPLETE: CPT | Performed by: PHYSICIAN ASSISTANT

## 2022-10-12 PROCEDURE — 99214 OFFICE O/P EST MOD 30 MIN: CPT | Performed by: PHYSICIAN ASSISTANT

## 2022-10-12 PROCEDURE — 81003 URINALYSIS AUTO W/O SCOPE: CPT | Performed by: PHYSICIAN ASSISTANT

## 2022-10-12 NOTE — PROGRESS NOTES
"Subjective   Savanah Keller is a 22 y.o. female.   Chief Complaint   Patient presents with   • Dizziness     Lightheaded, shakiness and mood swings started a few months ago      History of Present Illness   Pt presents with CC of dizziness/ light headedness, shakiness, mood swings, and fatigue   Symptoms have been going on the past few months  Will wake up feeling shaky with burst of energy. Will even clean her whole house. Notes it feels similar to using albuterol inhaler but has not used this in months. Prescribed sudafed earlier this week for URI symptoms. Did not seem to exacerbate symptoms   Frequent mood swings. Happy one minute and then sad the next.   Feels tired and fatigued a lot.     No new medication changes. No supplement use.  Does not consume a lot of caffeine. Denies drug use. Drinks alcohol occasionally     Light headed spells/ dizziness if standing up too quickly. Will last briefly before resolving. No know hx of hypotension. Did have episodes of low blood sugar in childhood   No hx of heart murmur     LMP 2 weeks ago. In middle of pack. Cycles have been normal. Denies chance of pregnancy       The following portions of the patient's history were reviewed and updated as appropriate: allergies, current medications, past family history, past medical history, past social history, past surgical history and problem list.    Review of Systems  As noted per HPI     Objective    Temperature 97.7 °F (36.5 °C), resp. rate 18, height 172.7 cm (68\"), weight 59 kg (130 lb), SpO2 99 %.     Physical Exam  Vitals and nursing note reviewed.   Constitutional:       Appearance: Normal appearance. She is well-developed.   HENT:      Head: Normocephalic and atraumatic.      Right Ear: Tympanic membrane, ear canal and external ear normal.      Left Ear: Tympanic membrane, ear canal and external ear normal.      Nose: Nose normal.      Mouth/Throat:      Pharynx: No oropharyngeal exudate.   Eyes:      Extraocular " Movements: Extraocular movements intact.      Conjunctiva/sclera: Conjunctivae normal.      Pupils: Pupils are equal, round, and reactive to light.   Neck:      Thyroid: No thyromegaly.      Trachea: No tracheal deviation.   Cardiovascular:      Rate and Rhythm: Normal rate and regular rhythm.      Heart sounds: Normal heart sounds.   Pulmonary:      Effort: Pulmonary effort is normal. No respiratory distress.      Breath sounds: Normal breath sounds. No wheezing or rales.   Chest:      Chest wall: No tenderness.   Abdominal:      General: Bowel sounds are normal. There is no distension.      Palpations: Abdomen is soft. There is no mass.      Tenderness: There is no abdominal tenderness. There is no guarding or rebound.      Hernia: No hernia is present.   Musculoskeletal:      Cervical back: Normal range of motion and neck supple.   Lymphadenopathy:      Cervical: No cervical adenopathy.   Skin:     General: Skin is warm and dry.   Neurological:      Mental Status: She is alert and oriented to person, place, and time.   Psychiatric:         Mood and Affect: Mood normal.         Behavior: Behavior normal.         Thought Content: Thought content normal.         Judgment: Judgment normal.           ECG 12 Lead    Date/Time: 10/12/2022 4:53 PM  Performed by: Flory Abbott PA  Authorized by: Flory Abbott PA   Comparison: not compared with previous ECG   Previous ECG: no previous ECG available  Rhythm: sinus rhythm  Rate: normal  Conduction: conduction normal  ST Segments: ST segments normal  T Waves: T waves normal  QRS axis: normal    Clinical impression: normal ECG            Assessment & Plan   Diagnoses and all orders for this visit:    1. Dizziness (Primary)  -     CBC w AUTO Differential  -     Comprehensive metabolic panel  -     TSH  -     T4, free  -     Thyroid Antibodies  -     T3, free  -     Hemoglobin A1c  -     Iron Profile  -     Vitamin B12  -     Folate  -     Magnesium  -     POCT urinalysis  dipstick, automated  -     POCT pregnancy, urine  -     ECG 12 Lead    2. Chronic fatigue  -     CBC w AUTO Differential  -     Comprehensive metabolic panel  -     TSH  -     T4, free  -     Thyroid Antibodies  -     T3, free  -     Iron Profile  -     Vitamin B12  -     Folate    3. Shakiness  -     TSH  -     T4, free  -     Thyroid Antibodies  -     T3, free  -     Hemoglobin A1c    4. Vitamin D deficiency  -     Vitamin D 25 hydroxy    5. Mood swings  -     FSH & LH  -     Estrogens, Total  -     Progesterone  -     Testosterone  -     Cortisol    EKG and orthostatic vitals normal.   UA normal   UPT negative.   Pt will obtain fasting labs at [facility] due to work restrictions and will follow up pending review.   If labs normal, consider underlying mood/ anxiety etiology. Pt voiced understanding

## 2022-10-26 DIAGNOSIS — R25.1 SHAKING: ICD-10-CM

## 2022-10-26 DIAGNOSIS — R45.86 MOOD SWINGS: ICD-10-CM

## 2022-10-26 DIAGNOSIS — F41.9 ANXIETY: Primary | ICD-10-CM

## 2022-11-04 ENCOUNTER — OFFICE VISIT (OUTPATIENT)
Dept: BEHAVIORAL HEALTH | Facility: CLINIC | Age: 22
End: 2022-11-04

## 2022-11-04 VITALS — HEIGHT: 68 IN | WEIGHT: 130 LBS | BODY MASS INDEX: 19.7 KG/M2

## 2022-11-04 DIAGNOSIS — F39 UNSPECIFIED MOOD (AFFECTIVE) DISORDER: Primary | ICD-10-CM

## 2022-11-04 PROCEDURE — 90792 PSYCH DIAG EVAL W/MED SRVCS: CPT

## 2022-11-04 RX ORDER — LAMOTRIGINE 25 MG/1
TABLET ORAL
Qty: 28 TABLET | Refills: 0 | Status: SHIPPED | OUTPATIENT
Start: 2022-11-04 | End: 2022-11-08 | Stop reason: DRUGHIGH
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
## 2022-11-07 ENCOUNTER — TELEPHONE (OUTPATIENT)
Dept: FAMILY MEDICINE CLINIC | Facility: CLINIC | Age: 22
End: 2022-11-07

## 2022-11-07 NOTE — TELEPHONE ENCOUNTER
Pharm[facility] Name:  [facility]    Pharmacy representative name:     Pharmacy representative phone number: [phone]    What medication are you calling in regards to: LAMOTREGEN    What question does the pharmacy have: 1 DAILY FOR 14 DAYS, DOES THE PROVIDER WANT TO INCREASE THIS MEDICATION TO 2 DAILY AFTER THE FIRST 14 DAYS OR CONTINUE WITH 1 FOR THE NEXT 14 DAYS?    Who is the provider that prescribed the medication: DR ROBERTS    Additional notes:

## 2022-11-08 DIAGNOSIS — F39 UNSPECIFIED MOOD (AFFECTIVE) DISORDER: Primary | ICD-10-CM

## 2022-11-08 RX ORDER — LAMOTRIGINE 25 MG/1
TABLET ORAL
Qty: 42 TABLET | Refills: 0 | Status: SHIPPED | OUTPATIENT
Start: 2022-11-08 | End: 2022-12-02 | Stop reason: CLARIF

## 2022-11-22 ENCOUNTER — OFFICE VISIT (OUTPATIENT)
Dept: BEHAVIORAL HEALTH | Facility: CLINIC | Age: 22
End: 2022-11-22

## 2022-11-22 VITALS — WEIGHT: 130 LBS | HEIGHT: 68 IN | BODY MASS INDEX: 19.7 KG/M2

## 2022-11-22 DIAGNOSIS — F39 UNSPECIFIED MOOD (AFFECTIVE) DISORDER: Primary | ICD-10-CM

## 2022-11-22 PROCEDURE — 99213 OFFICE O/P EST LOW 20 MIN: CPT

## 2022-11-22 NOTE — PROGRESS NOTES
"     Office  Follow Up Visit      Patient Name: Savanah Keller  : 2000   MRN: [id number]     Referring Provider: Aleyda Sinha DO    Chief Complaint: Medication follow-up    ICD-10-CM ICD-9-CM   1. Unspecified mood (affective) disorder ([facility])  F39 296.90        History of Present Illness:   Savanah Keller is a 22 y.o. female who is here today for follow up related to medication management of mood disorder symptomology.  Patient reports she has not noticed any significant difference in her mood at this time.  However, related to ordering prescription error, she is only been on lamotrigine at 25 mg dose.  She reports she is ready to increase to 50 mg dose tomorrow.  Patient reports continued anxiety and dysphoric mood.  Patient also reports probable situational stress related to the upcoming Thanksgiving holiday but states \"I will do my best to deal with it.\"      Subjective      Review of Systems:   Review of Systems   Constitutional: Negative.    Respiratory: Negative.    Cardiovascular: Negative.    Gastrointestinal: Negative.    Musculoskeletal: Negative.    Skin: Negative.    Psychiatric/Behavioral: Positive for dysphoric mood and sleep disturbance. The patient is nervous/anxious.        Patient History:   The following portions of the patient's history were reviewed and updated as appropriate: allergies, current medications, past family history, past medical history, past social history, past surgical history and problem list.     Social:  No change interval history.    Medications:     Current Outpatient Medications:   •  albuterol sulfate  (90 Base) MCG/ACT inhaler, Inhale 2 puffs Every 4 (Four) Hours As Needed for Wheezing or Shortness of Air., Disp: 8 g, Rfl: 5  •  Drospiren-Eth Estrad-Levomefol 3-0.02-0.451 MG tablet, Take 1 tablet by mouth Daily., Disp: 28 tablet, Rfl: 11  •  famotidine (Pepcid) 20 MG tablet, Take 1 tablet by mouth 2 (Two) Times a Day., Disp: 60 tablet, Rfl: 0  •  " "lamoTRIgine (LaMICtal) 25 MG tablet, Take 1 tablet by mouth Daily for 14 days, THEN 2 tablets Daily for 14 days., Disp: 42 tablet, Rfl: 0  •  montelukast (SINGULAIR) 10 MG tablet, Take 1 tablet by mouth Every Night., Disp: 90 tablet, Rfl: 3    Objective     Physical Exam:  Vital Signs:   Vitals:    11/22/22 1629   Weight: 59 kg (130 lb)   Height: 172.7 cm (68\")     Body mass index is 19.77 kg/m².     Mental Status Exam:   Hygiene:   good  Cooperation:  Cooperative  Eye Contact:  Good  Psychomotor Behavior:  Appropriate  Affect:  Euphoric  Mood: anxious  Speech:  Normal  Thought Process:  Goal directed  Thought Content:  Mood congruent  Suicidal:  None  Homicidal:  None  Hallucinations:  None  Delusion:  None  Memory:  Intact  Orientation:  Grossly intact  Reliability:  good  Insight:  Fair  Judgement:  Good  Impulse Control:  Good  Physical/Medical Issues:  No      Assessment / Plan      Visit Diagnosis/Orders Placed This Visit:  Diagnoses and all orders for this visit:    1. Unspecified mood (affective) disorder (HCC) (Primary)         Differential:   PTSD    Plan:   1. Continue lamotrigine titration as prescribed.  Continue 50 mg dose for 2 weeks.  2. Will increase lamotrigine to 100 mg after 2 week 50 mg titration has been completed.  3. Keep psychotherapy evaluation appointment with LCSW as scheduled.  4. Recommendation: Continued self-care and sleep hygiene.    Continue supportive psychotherapy efforts and medications as indicated. Treatment and medication options discussed during today's visit. Patient ackowledged and verbally consented to continue with current treatment plan and was educated on the importance of compliance with treatment and follow-up appointments. Patient seems reasonably able to adhere to treatment plan.      Medication Considerations:  Discussed medication options and treatment plan of prescribed medication(s) as well as the risks, benefits, and potential side effects. Patient is agreeable " to call the office with any worsening of symptoms or onset of side effects. Patient is agreeable to call 911 or go to the nearest ER should he/she begin having SI/HI.    Quality Measures:   Never smoker    I advised Savanah Keller of the risks of tobacco use.     Follow Up:   Return in about 3 weeks (around 12/13/2022) for Recheck.      EDY Yañez, PMHNP-BC

## 2022-12-02 ENCOUNTER — TELEPHONE (OUTPATIENT)
Dept: BEHAVIORAL HEALTH | Facility: CLINIC | Age: 22
End: 2022-12-02

## 2022-12-02 DIAGNOSIS — F39 UNSPECIFIED MOOD (AFFECTIVE) DISORDER: Primary | ICD-10-CM

## 2022-12-02 RX ORDER — LAMOTRIGINE 100 MG/1
100 TABLET ORAL DAILY
Qty: 30 TABLET | Refills: 2 | Status: SHIPPED | OUTPATIENT
Start: 2022-12-02 | End: 2022-12-16

## 2022-12-02 NOTE — TELEPHONE ENCOUNTER
Called patient to see if she was able to initiate lamotrigine related to an order error on my end.    Patient for me she has been taking medication and is now on the 50 mg dose.    I will go ahead and order the 100 mg dose and instructed patient to take new dose once she has finished her 50 mg titration.    Thanks

## 2022-12-13 ENCOUNTER — OFFICE VISIT (OUTPATIENT)
Dept: BEHAVIORAL HEALTH | Facility: CLINIC | Age: 22
End: 2022-12-13

## 2022-12-13 DIAGNOSIS — F43.23 ADJUSTMENT DISORDER WITH MIXED ANXIETY AND DEPRESSED MOOD: Primary | ICD-10-CM

## 2022-12-13 PROCEDURE — 90791 PSYCH DIAGNOSTIC EVALUATION: CPT | Performed by: SOCIAL WORKER
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
## 2022-12-14 ENCOUNTER — OFFICE VISIT (OUTPATIENT)
Dept: BEHAVIORAL HEALTH | Facility: CLINIC | Age: 22
End: 2022-12-14

## 2022-12-14 DIAGNOSIS — F90.0 ADHD (ATTENTION DEFICIT HYPERACTIVITY DISORDER), INATTENTIVE TYPE: Primary | ICD-10-CM

## 2022-12-14 PROCEDURE — 99213 OFFICE O/P EST LOW 20 MIN: CPT

## 2022-12-14 NOTE — PROGRESS NOTES
"     Office  Follow Up Visit      Patient Name: Savanah Keller  : 2000   MRN: [id number]     Referring Provider: Aleyda Sinha DO    Chief Complaint: Medication follow-up    ICD-10-CM ICD-9-CM   1. ADHD (attention deficit hyperactivity disorder), inattentive type  F90.0 314.00        History of Present Illness:   Savanah Keller is a 22 y.o. female who is here today for follow up related to anxiety and nervousness.  Patient reports she has noticed no improvement in mood stability since continuing lamotrigine titration.  She reports continued racing thoughts, continue anxiety, difficulty with concentration, difficulty with sustained focus, and states \"I get overstimulated and becomes overwhelming.\"  Patient reports several instances of increased agitation related to stimulus overload stating \"I was trying to plug something into the wall and I got so distracted and could not I got so angry.\"  Patient reports some clarification with mood fluctuations stating \"when I am not work I feel a whole lot better.\"  Patient denies any manic symptoms lasting longer than a few days.  Stating \"I might have some up days but they only usually last a day or 2 where I just feel good and is usually when I am not at work.\"  However, patient reports episodes of depression lasting longer than 2 weeks in which she has experienced depressed mood, anhedonia, fatigue, and poor self-esteem.  Patient states she feels her biggest concern at the moment is related to poor concentration and focus and being overstimulated which in turn causes agitation.  Patient also reports she is excited for winter break at weeks end.      Subjective      Review of Systems:   Review of Systems   Constitutional: Negative.    Respiratory: Negative.    Cardiovascular: Negative.    Gastrointestinal: Negative.    Musculoskeletal: Negative.    Skin: Negative.    Neurological: Negative.    Psychiatric/Behavioral: Positive for decreased concentration. The " "patient is nervous/anxious.        Patient History:   The following portions of the patient's history were reviewed and updated as appropriate: allergies, current medications, past family history, past medical history, past social history, past surgical history and problem list.     Social:  No change in interval history.    Medications:     Current Outpatient Medications:   •  albuterol sulfate  (90 Base) MCG/ACT inhaler, Inhale 2 puffs Every 4 (Four) Hours As Needed for Wheezing or Shortness of Air., Disp: 8 g, Rfl: 5  •  Drospiren-Eth Estrad-Levomefol 3-0.02-0.451 MG tablet, Take 1 tablet by mouth Daily., Disp: 28 tablet, Rfl: 11  •  famotidine (Pepcid) 20 MG tablet, Take 1 tablet by mouth 2 (Two) Times a Day., Disp: 60 tablet, Rfl: 0  •  lamoTRIgine (LaMICtal) 100 MG tablet, Take 1 tablet by mouth Daily., Disp: 30 tablet, Rfl: 2  •  montelukast (SINGULAIR) 10 MG tablet, Take 1 tablet by mouth Every Night., Disp: 90 tablet, Rfl: 3    Objective     Physical Exam:  Vital Signs:   Vitals:    12/16/22 0743   Weight: 59 kg (130 lb)   Height: 172.7 cm (68\")     Body mass index is 19.77 kg/m².     Mental Status Exam:   Hygiene:   good  Cooperation:  Cooperative  Eye Contact:  Good  Psychomotor Behavior:  Restless  Affect:  Restricted  Mood: anxious  Speech:  Pressured  Thought Process:  Circum  Thought Content:  Normal  Suicidal:  None  Homicidal:  None  Hallucinations:  None  Delusion:  None  Memory:  Intact  Orientation:  Grossly intact  Reliability:  good  Insight:  Fair  Judgement:  Good  Impulse Control:  Fair  Physical/Medical Issues:  No      Assessment / Plan      Visit Diagnosis/Orders Placed This Visit:  Diagnoses and all orders for this visit:    1. ADHD (attention deficit hyperactivity disorder), inattentive type (Primary)         Differential:   Mood disorder    Plan:   1. Discontinue lamotrigine.  2. Recommendation: Initiate Concerta 18 mg daily.  We will send to PCP.  3. Continue sleep hygiene and " self-care.  4. Continue psychotherapy with LCSW as scheduled.    Continue supportive psychotherapy efforts and medications as indicated. Treatment and medication options discussed during today's visit. Patient ackowledged and verbally consented to continue with current treatment plan and was educated on the importance of compliance with treatment and follow-up appointments. Patient seems reasonably able to adhere to treatment plan.      Medication Considerations:  Discussed medication options and treatment plan of prescribed medication(s) as well as the risks, benefits, and potential side effects. Patient is agreeable to call the office with any worsening of symptoms or onset of side effects. Patient is agreeable to call 911 or go to the nearest ER should he/she begin having SI/HI.    Quality Measures:   Never smoker    I advised Savanah Keller of the risks of tobacco use.     Follow Up:   Return in about 3 weeks (around 1/4/2023) for Recheck.      EDY Yañez, PMHNP-BC

## 2022-12-16 VITALS — WEIGHT: 130 LBS | HEIGHT: 68 IN | BODY MASS INDEX: 19.7 KG/M2

## 2022-12-16 PROBLEM — F90.0 ADHD (ATTENTION DEFICIT HYPERACTIVITY DISORDER), INATTENTIVE TYPE: Status: ACTIVE | Noted: 2022-12-16

## 2022-12-19 ENCOUNTER — TELEPHONE (OUTPATIENT)
Dept: FAMILY MEDICINE CLINIC | Facility: CLINIC | Age: 22
End: 2022-12-19

## 2022-12-19 DIAGNOSIS — F90.0 ADHD (ATTENTION DEFICIT HYPERACTIVITY DISORDER), INATTENTIVE TYPE: Primary | ICD-10-CM

## 2022-12-19 RX ORDER — METHYLPHENIDATE HYDROCHLORIDE 18 MG/1
18 TABLET ORAL EVERY MORNING
Qty: 30 TABLET | Refills: 0 | Status: SHIPPED | OUTPATIENT
Start: 2022-12-19 | End: 2023-01-07 | Stop reason: SDUPTHER

## 2022-12-19 NOTE — TELEPHONE ENCOUNTER
concerta 18 MG 1 a day     Quentin was not able to call this medication in for this patient ans stated that she was going to see if dr subramanian was able to call this in?

## 2022-12-30 ENCOUNTER — OFFICE VISIT (OUTPATIENT)
Dept: BEHAVIORAL HEALTH | Facility: CLINIC | Age: 22
End: 2022-12-30

## 2022-12-30 DIAGNOSIS — F43.23 ADJUSTMENT DISORDER WITH MIXED ANXIETY AND DEPRESSED MOOD: Primary | ICD-10-CM

## 2022-12-30 PROCEDURE — 90832 PSYTX W PT 30 MINUTES: CPT | Performed by: SOCIAL WORKER

## 2022-12-30 NOTE — PROGRESS NOTES
[facility]                  Follow Up Adult      Follow Up Adult Note     Date:2022   Patient Name: Savanah Keller  : 2000   MRN: [id number]   Time IN: 4:15 PM    Time OUT: 4:45 PM     Referring Provider: Aleyda Sinha DO    Chief Complaint:      ICD-10-CM ICD-9-CM   1. Adjustment disorder with mixed anxiety and depressed mood  F43.23 309.28        History of Present Illness:   Savanah Keller is a 22 y.o. female who is being seen today for follow up individual Psychotherapy session.        Subjective     Assessment Scores:   PHQ-9 Total Score: PHQ-9 Total Score:     DAVID-7 Score:      Patient's Support Network Includes:  significant other and friends    Functional Status: Mild impairment     Progress toward goal: Not at goal    Prognosis: Good with Ongoing Treatment     Medications:     Current Outpatient Medications:   •  albuterol sulfate  (90 Base) MCG/ACT inhaler, Inhale 2 puffs Every 4 (Four) Hours As Needed for Wheezing or Shortness of Air., Disp: 8 g, Rfl: 5  •  Drospiren-Eth Estrad-Levomefol 3-0.02-0.451 MG tablet, Take 1 tablet by mouth Daily., Disp: 28 tablet, Rfl: 11  •  famotidine (Pepcid) 20 MG tablet, Take 1 tablet by mouth 2 (Two) Times a Day., Disp: 60 tablet, Rfl: 0  •  methylphenidate (Concerta) 18 MG CR tablet, Take 1 tablet by mouth Every Morning, Disp: 30 tablet, Rfl: 0  •  montelukast (SINGULAIR) 10 MG tablet, Take 1 tablet by mouth Every Night., Disp: 90 tablet, Rfl: 3    Allergies:   Allergies   Allergen Reactions   • Nsaids Swelling   • Penicillins Rash       Objective     Mental Status Exam:   Hygiene:   good  Cooperation:  Cooperative  Eye Contact:  Good  Psychomotor Behavior:  Appropriate  Affect:  Full range  Mood: euthymic  Speech:  Normal  Thought Process:  Goal directed and Linear  Thought Content:  Mood congruent  Suicidal:  None  Homicidal:  None  Hallucinations:  None  Delusion:  None  Memory:   Intact  Orientation:  Person, Place, Time and Situation  Reliability:  good  Insight:  Good  Judgement:  Good  Impulse Control:  Good  Physical/Medical Issues:  None reported at this time     Assessment / Plan      Visit Diagnosis/Orders Placed This Visit:    ICD-10-CM ICD-9-CM   1. Adjustment disorder with mixed anxiety and depressed mood  F43.23 309.28          PLAN:  1. Safety: No acute safety concerns  2. Risk Assessment: Risk of self-harm acutely is low. Risk of self-harm chronically is also low, but could be further elevated in the event of treatment noncompliance and/or AODA.    Patient met with the undersigned on this day in office for individual psychotherapy session. She affirmed taking medication as prescribed and feeling as if medication had been somewhat helpful for sustaining attention. Patient affirmed struggling to manage anxiety recently. Therapist facilitated patient exploration of the impact of strained interpersonal relationships with family members and daily life stressors upon current thoughts, feelings, behavior, and interpersonal relationships in session and offered support and validation of patient's emotional experience. Therapist and patient discussed the importance of practicing positive coping skills for emotional regulation including keeping a journal to identify anxious thoughts. Patient denied any current suicidal or homicidal ideation. She further denied any death wishes or auditory/visual hallucinations; oriented to person, place, time, and situation. Patient will continue biweekly outpatient psychotherapy.    Treatment Plan/Goals: Continue supportive psychotherapy efforts and medications as indicated. Treatment and medication options discussed during today's visit. Patient ackowledged and verbally consented to continue with current treatment plan and was educated on the importance of compliance with treatment and follow-up appointments. Patient seems reasonably able to adhere to  treatment plan.      Assisted Patient in processing above session content; acknowledged and normalized patient’s thoughts, feelings, and concerns.  Rationalized patient thought process regarding symptoms and daily life stressors.      Allowed Patient to freely discuss issues  without interruption or judgement with unconditional positive regard, active listening skills, and empathy. Therapist provided a safe, confidential environment to facilitate the development of a positive therapeutic relationship and encouraged open, honest communication. Assisted Patient in identifying risk factors which would indicate the need for higher level of care including thoughts to harm self or others and/or self-harming behavior and encouraged Patient to contact this office, call 911, or present to the nearest emergency room should any of these events occur. Discussed crisis intervention services and means to access. Patient adamantly and convincingly denies current suicidal or homicidal ideation or perceptual disturbance. Assisted Patient in processing session content; acknowledged and normalized Patient’s thoughts, feelings, and concerns by utilizing a person-centered approach in efforts to build appropriate rapport and a positive therapeutic relationship with open and honest communication.     Quality Measures:     TOBACCO USE:  Never smoker    Follow Up:   Return in about 2 weeks (around 1/13/2023) for Psychotherapy Follow-Up.      Dayana Crain LCSW

## 2023-01-04 ENCOUNTER — OFFICE VISIT (OUTPATIENT)
Dept: BEHAVIORAL HEALTH | Facility: CLINIC | Age: 23
End: 2023-01-04
Payer: COMMERCIAL

## 2023-01-04 VITALS
SYSTOLIC BLOOD PRESSURE: 121 MMHG | BODY MASS INDEX: 19.1 KG/M2 | HEIGHT: 68 IN | DIASTOLIC BLOOD PRESSURE: 78 MMHG | WEIGHT: 126 LBS

## 2023-01-04 DIAGNOSIS — F90.0 ADHD (ATTENTION DEFICIT HYPERACTIVITY DISORDER), INATTENTIVE TYPE: Primary | ICD-10-CM

## 2023-01-04 PROCEDURE — 99213 OFFICE O/P EST LOW 20 MIN: CPT

## 2023-01-04 NOTE — PROGRESS NOTES
Office  Follow Up Visit      Patient Name: Savanah Keller  : 2000   MRN: [id number]     Referring Provider: Aleyda Sinha DO    Chief Complaint: Medication follow-up    ICD-10-CM ICD-9-CM   1. ADHD (attention deficit hyperactivity disorder), inattentive type  F90.0 314.00        History of Present Illness:   Savanah Keller is a 22 y.o. female who is here today for follow up related to medication management of ADHD symptomology.  Patient reports since initiating Concerta 18 mg she has noticed an improvement in focus, sustained attention, and ability to stay on task easier.  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  Patient also reports she has been \"sleeping well and have not noticed any relevant decrease in appetite.\"  Patient reports medication is lasting around 7 hours and has noticed a decrease in symptom management over the last week or so.      Subjective      Review of Systems:   Review of Systems   Constitutional: Negative.    Respiratory: Negative.    Cardiovascular: Negative.    Gastrointestinal: Negative.    Genitourinary: Negative.    Musculoskeletal: Negative.    Skin: Negative.    Neurological: Negative.    Psychiatric/Behavioral: The patient is nervous/anxious.        Patient History:   The following portions of the patient's history were reviewed and updated as appropriate: allergies, current medications, past family history, past medical history, past social history, past surgical history and problem list.     Social:  No change interval history.    Medications:     Current Outpatient Medications:   •  albuterol sulfate  (90 Base) MCG/ACT inhaler, Inhale 2 puffs Every 4 (Four) Hours As Needed for Wheezing or Shortness of Air., Disp: 8 g, Rfl: 5  •  Drospiren-Eth Estrad-Levomefol 3-0.02-0.451 MG tablet,  Take 1 tablet by mouth Daily., Disp: 28 tablet, Rfl: 11  •  famotidine (Pepcid) 20 MG tablet, Take 1 tablet by mouth 2 (Two) Times a Day., Disp: 60 tablet, Rfl: 0  •  methylphenidate (Concerta) 18 MG CR tablet, Take 1 tablet by mouth Every Morning, Disp: 30 tablet, Rfl: 0  •  montelukast (SINGULAIR) 10 MG tablet, Take 1 tablet by mouth Every Night., Disp: 90 tablet, Rfl: 3    Objective     Physical Exam:  Vital Signs:   Vitals:    01/04/23 1646   BP: 121/78   Weight: 57.2 kg (126 lb)   Height: 172.7 cm (68\")     Body mass index is 19.16 kg/m².     Mental Status Exam:   Hygiene:   good  Cooperation:  Cooperative  Eye Contact:  Good  Psychomotor Behavior:  Restless  Affect:  Appropriate  Mood: euthymic  Speech:  Normal  Thought Process:  Circum  Thought Content:  Mood congruent  Suicidal:  None  Homicidal:  None  Hallucinations:  None  Delusion:  None  Memory:  Intact  Orientation:  Grossly intact  Reliability:  good  Insight:  Fair  Judgement:  Good  Impulse Control:  Fair  Physical/Medical Issues:  No      Assessment / Plan      Visit Diagnosis/Orders Placed This Visit:  Diagnoses and all orders for this visit:    1. ADHD (attention deficit hyperactivity disorder), inattentive type (Primary)         Differential:   N/A    Plan:   1. Recommendation: Increase Concerta to 36 mg daily.  We will send recommendation to PCP.  2. Discussed the possibility of an immediate release stimulant booster in the afternoon.  3. Continue psychotherapy with LCSW as scheduled.  4. Discussed mindfulness and means to decrease anxiety.  5. Recommendation: Continue self-care and sleep hygiene.    Continue supportive psychotherapy efforts and medications as indicated. Treatment and medication options discussed during today's visit. Patient ackowledged and verbally consented to continue with current treatment plan and was educated on the importance of compliance with treatment and follow-up appointments. Patient seems reasonably able to  adhere to treatment plan.      Medication Considerations:  Discussed medication options and treatment plan of prescribed medication(s) as well as the risks, benefits, and potential side effects. Patient is agreeable to call the office with any worsening of symptoms or onset of side effects. Patient is agreeable to call 911 or go to the nearest ER should he/she begin having SI/HI.    Quality Measures:   Never smoker    I advised Savanah Keller of the risks of tobacco use.     Follow Up:   Return in about 3 weeks (around 1/25/2023) for Recheck.      EDY Yañez, PMHNP-BC

## 2023-01-07 DIAGNOSIS — F90.0 ADHD (ATTENTION DEFICIT HYPERACTIVITY DISORDER), INATTENTIVE TYPE: ICD-10-CM

## 2023-01-07 RX ORDER — METHYLPHENIDATE HYDROCHLORIDE 36 MG/1
36 TABLET ORAL EVERY MORNING
Qty: 30 TABLET | Refills: 0 | Status: SHIPPED | OUTPATIENT
Start: 2023-01-07 | End: 2023-02-16 | Stop reason: ALTCHOICE

## 2023-01-26 ENCOUNTER — OFFICE VISIT (OUTPATIENT)
Dept: BEHAVIORAL HEALTH | Facility: CLINIC | Age: 23
End: 2023-01-26
Payer: COMMERCIAL

## 2023-01-26 DIAGNOSIS — F43.23 ADJUSTMENT DISORDER WITH MIXED ANXIETY AND DEPRESSED MOOD: Primary | ICD-10-CM

## 2023-01-26 PROCEDURE — 90834 PSYTX W PT 45 MINUTES: CPT | Performed by: SOCIAL WORKER

## 2023-01-26 NOTE — PROGRESS NOTES
[facility]                  Follow Up Adult      Follow Up Adult Note     Date:2023   Patient Name: Savanah Keller  : 2000   MRN: [id number]   Time IN: 4:15 PM    Time OUT: 5:00 PM     Referring Provider: Aledya Sinha DO    Chief Complaint:      ICD-10-CM ICD-9-CM   1. Adjustment disorder with mixed anxiety and depressed mood  F43.23 309.28        History of Present Illness:   Savanah Keller is a 22 y.o. female who is being seen today for follow up individual Psychotherapy session.        Subjective     Assessment Scores:   PHQ-9 Total Score: PHQ-9 Total Score:     DAVID-7 Score:      Patient's Support Network Includes:  significant other, extended family and friends    Functional Status: Moderate impairment     Progress toward goal: Not at goal    Prognosis: Good with Ongoing Treatment     Medications:     Current Outpatient Medications:   •  albuterol sulfate  (90 Base) MCG/ACT inhaler, Inhale 2 puffs Every 4 (Four) Hours As Needed for Wheezing or Shortness of Air., Disp: 8 g, Rfl: 5  •  Drospiren-Eth Estrad-Levomefol 3-0.02-0.451 MG tablet, Take 1 tablet by mouth Daily., Disp: 28 tablet, Rfl: 11  •  famotidine (Pepcid) 20 MG tablet, Take 1 tablet by mouth 2 (Two) Times a Day., Disp: 60 tablet, Rfl: 0  •  methylphenidate (Concerta) 36 MG CR tablet, Take 1 tablet by mouth Every Morning, Disp: 30 tablet, Rfl: 0  •  montelukast (SINGULAIR) 10 MG tablet, Take 1 tablet by mouth Every Night., Disp: 90 tablet, Rfl: 3    Allergies:   Allergies   Allergen Reactions   • Nsaids Swelling   • Penicillins Rash       Objective     Mental Status Exam:   Hygiene:   good  Cooperation:  Cooperative  Eye Contact:  Good  Psychomotor Behavior:  Appropriate  Affect:  Full range  Mood: euthymic  Speech:  Normal  Thought Process:  Goal directed and Linear  Thought Content:  Mood congruent  Suicidal:  None  Homicidal:  None  Hallucinations:  None  Delusion:   None  Memory:  Intact  Orientation:  Person, Place, Time and Situation  Reliability:  good  Insight:  Good  Judgement:  Good  Impulse Control:  Good  Physical/Medical Issues:  None reported at this time     Assessment / Plan      Visit Diagnosis/Orders Placed This Visit:    ICD-10-CM ICD-9-CM   1. Adjustment disorder with mixed anxiety and depressed mood  F43.23 309.28          PLAN:  1. Safety: No acute safety concerns  2. Risk Assessment: Risk of self-harm acutely is low. Risk of self-harm chronically is also low, but could be further elevated in the event of treatment noncompliance and/or AODA.    Patient met with the undersigned on this day in office for individual psychotherapy session. She affirmed taking medication as prescribed and feeling as if medication had been somewhat helpful for sustaining attention. Patient reported struggling to manage anxiety and crying spells recently. She further reported struggling with suicidal ideation last week. She denied any current suicidal ideation. She further denied plan or intention to end her life. Therapist facilitated patient exploration of the impact of worries and daily life stressors upon current thoughts, feelings, behavior, and interpersonal relationships in session and offered support and validation of patient's emotional experience. Therapist and patient discussed patient values and goals for the future as well as the importance of maintaining safety at all times and practicing positive coping skills for emotional regulation. Patient denied any current suicidal or homicidal ideation. She further denied any death wishes or auditory/visual hallucinations; oriented to person, place, time, and situation. Patient will continue outpatient psychotherapy in 3 weeks.    Treatment Plan/Goals: Continue supportive psychotherapy efforts and medications as indicated. Treatment and medication options discussed during today's visit. Patient ackowledged and verbally consented  to continue with current treatment plan and was educated on the importance of compliance with treatment and follow-up appointments. Patient seems reasonably able to adhere to treatment plan.      Assisted Patient in processing above session content; acknowledged and normalized patient’s thoughts, feelings, and concerns.  Rationalized patient thought process regarding symptoms and daily life stressors.      Allowed Patient to freely discuss issues  without interruption or judgement with unconditional positive regard, active listening skills, and empathy. Therapist provided a safe, confidential environment to facilitate the development of a positive therapeutic relationship and encouraged open, honest communication. Assisted Patient in identifying risk factors which would indicate the need for higher level of care including thoughts to harm self or others and/or self-harming behavior and encouraged Patient to contact this office, call 911, or present to the nearest emergency room should any of these events occur. Discussed crisis intervention services and means to access. Patient adamantly and convincingly denies current suicidal or homicidal ideation or perceptual disturbance. Assisted Patient in processing session content; acknowledged and normalized Patient’s thoughts, feelings, and concerns by utilizing a person-centered approach in efforts to build appropriate rapport and a positive therapeutic relationship with open and honest communication.     Quality Measures:     TOBACCO USE:  Never smoker    Follow Up:   Return in about 3 years (around 1/26/2026) for Psychotherapy Follow-Up.      Dayana Crain LCSW

## 2023-01-31 ENCOUNTER — OFFICE VISIT (OUTPATIENT)
Dept: BEHAVIORAL HEALTH | Facility: CLINIC | Age: 23
End: 2023-01-31
Payer: COMMERCIAL

## 2023-01-31 VITALS
BODY MASS INDEX: 18.94 KG/M2 | HEART RATE: 77 BPM | DIASTOLIC BLOOD PRESSURE: 81 MMHG | SYSTOLIC BLOOD PRESSURE: 128 MMHG | HEIGHT: 68 IN | WEIGHT: 125 LBS

## 2023-01-31 DIAGNOSIS — F90.0 ADHD (ATTENTION DEFICIT HYPERACTIVITY DISORDER), INATTENTIVE TYPE: Primary | ICD-10-CM

## 2023-01-31 PROCEDURE — 99213 OFFICE O/P EST LOW 20 MIN: CPT

## 2023-02-15 ENCOUNTER — TELEMEDICINE (OUTPATIENT)
Dept: BEHAVIORAL HEALTH | Facility: CLINIC | Age: 23
End: 2023-02-15
Payer: COMMERCIAL

## 2023-02-15 DIAGNOSIS — F43.23 ADJUSTMENT DISORDER WITH MIXED ANXIETY AND DEPRESSED MOOD: Primary | ICD-10-CM

## 2023-02-15 PROCEDURE — 90834 PSYTX W PT 45 MINUTES: CPT | Performed by: SOCIAL WORKER

## 2023-02-15 NOTE — PROGRESS NOTES
[facility]                Telehealth (Video) Visit     Telehealth  (Video) Visit      Patient Name: Savanah Keller  : 2000   MRN: [id number]   Time In: 10:45 AM      Time Out: 11:30 AM     Referring Physician: Aleyda Sinha DO    Chief Complaint:      ICD-10-CM ICD-9-CM   1. Adjustment disorder with mixed anxiety and depressed mood  F43.23 309.28        This provider is located [address] [address]. This visit is being done on behalf of [facility] using a Zoom platform for a video visit in a secure and private environment. The Patient is seen remotely at their home address in [place], using a private computer, phone or tablet.  The patient is able to be seen through a MyChart Video Visit through [employer] at today's encounter. Patient is being evaluated/treated via telehealth by Zoom, and stated they are in a secure environment for this session. The patient's condition being diagnosed/treated is appropriate for telemedicine. The provider identified herself as well as her credentials.   The patient, and/or patient's guardian, consent to being seen remotely, and when consent is given they understand that the consent allows for patient identifiable information to be sent to a third party as needed.   They may refuse to be seen remotely at any time. The electronic data is encrypted and password protected, and the patient and/or guardian has been advised of the potential risks to privacy not withstanding such measures.    You have chosen to receive care through a video visit today. Do you consent to use a video visit for your medical care today? Patient requested video visit on this day.   This visit has been scheduled as a video visit to comply with patient safety concerns in accordance with CDC recommendations.    History of Present Illness: Savanah Keller is a 22 y.o. female who I saw  today via video visit for inidividual psychotherapy session.        Subjective     Medications:     Current Outpatient Medications:   •  albuterol sulfate  (90 Base) MCG/ACT inhaler, Inhale 2 puffs Every 4 (Four) Hours As Needed for Wheezing or Shortness of Air., Disp: 8 g, Rfl: 5  •  Drospiren-Eth Estrad-Levomefol 3-0.02-0.451 MG tablet, Take 1 tablet by mouth Daily., Disp: 28 tablet, Rfl: 11  •  famotidine (Pepcid) 20 MG tablet, Take 1 tablet by mouth 2 (Two) Times a Day., Disp: 60 tablet, Rfl: 0  •  methylphenidate (Concerta) 36 MG CR tablet, Take 1 tablet by mouth Every Morning, Disp: 30 tablet, Rfl: 0  •  montelukast (SINGULAIR) 10 MG tablet, Take 1 tablet by mouth Every Night., Disp: 90 tablet, Rfl: 3    Objective     Mental Status Exam:   Hygiene:   good  Cooperation:  Cooperative  Eye Contact:  Good  Psychomotor Behavior:  Appropriate  Affect:  Full range  Mood: anxious  Speech:  Normal  Thought Process:  Goal directed and Linear  Thought Content:  Mood congruent  Suicidal:  None  Homicidal:  None  Hallucinations:  None  Delusion:  None  Memory:  Intact  Orientation:  Person, Place, Time and Situation  Reliability:  good  Insight:  Good  Judgement:  Good  Impulse Control:  Good  Physical/Medical Issues:  None reported at this time     Assessment / Plan      Assessment/Plan:   Diagnoses and all orders for this visit:    1. Adjustment disorder with mixed anxiety and depressed mood (Primary)    Patient met with the undersigned on this day via video visit for individual psychotherapy session. She reported taking medication as prescribed and feeling as if medication had been somewhat unhelpful for sustaining attention and managing anxiety. Patient expressed intention to discuss symptoms and medication concerns with psychiatric APRN. Therapist facilitated patient exploration of the impact of worries and daily life stressors upon current thoughts, feelings, behavior, and interpersonal relationships in  session and offered support and validation of patient's emotional experience. Therapist and patient discussed positive coping skills for anxiety management. Patient denied any current suicidal or homicidal ideation. She further denied any death wishes or auditory/visual hallucinations; oriented to person, place, time, and situation. Patient will continue outpatient psychotherapy in about 3 weeks.    TREATMENT PLAN/GOALS: Continue supportive psychotherapy efforts and medications as indicated. Treatment and medication options discussed during today's visit. Patient ackowledged and verbally consented to continue with current treatment plan and was educated on the importance of compliance with treatment and follow-up appointments. Patient seems reasonably able to adhere to treatment plan.      Allowed Patient to freely discuss issues  without interruption or judgement with unconditional positive regard, active listening skills, and empathy. Therapist provided a safe, confidential environment to facilitate the development of a positive therapeutic relationship and encouraged open, honest communication. Assisted Patient in identifying risk factors which would indicate the need for higher level of care including thoughts to harm self or others and/or self-harming behavior and encouraged Patient to contact this office, call 911, or present to the nearest emergency room should any of these events occur. Discussed crisis intervention services and means to access. Patient adamantly and convincingly denies current suicidal or homicidal ideation or perceptual disturbance. Assisted Patient in processing session content; acknowledged and normalized Patient’s thoughts, feelings, and concerns by utilizing a person-centered approach in efforts to build appropriate rapport and a positive therapeutic relationship with open and honest communication.     Quality Measures:     TOBACCO USE:  Never smoker    I advised Savanah of the risks of  tobacco use    Follow Up:   Return in about 3 weeks (around 3/8/2023) for Psychotherapy Follow-Up.    Dayana Crain LCSW

## 2023-02-16 ENCOUNTER — TELEPHONE (OUTPATIENT)
Dept: BEHAVIORAL HEALTH | Facility: CLINIC | Age: 23
End: 2023-02-16
Payer: COMMERCIAL

## 2023-02-16 DIAGNOSIS — F90.0 ADHD (ATTENTION DEFICIT HYPERACTIVITY DISORDER), INATTENTIVE TYPE: Primary | ICD-10-CM

## 2023-02-16 RX ORDER — DEXTROAMPHETAMINE SACCHARATE, AMPHETAMINE ASPARTATE MONOHYDRATE, DEXTROAMPHETAMINE SULFATE AND AMPHETAMINE SULFATE 2.5; 2.5; 2.5; 2.5 MG/1; MG/1; MG/1; MG/1
10 CAPSULE, EXTENDED RELEASE ORAL EVERY MORNING
Qty: 30 CAPSULE | Refills: 0 | Status: SHIPPED | OUTPATIENT
Start: 2023-02-16 | End: 2023-03-21 | Stop reason: DRUGHIGH

## 2023-02-16 NOTE — TELEPHONE ENCOUNTER
Called patient back regarding her concerns with her stimulant medication.  Patient reports continued effectiveness of medication and significant symptom rebound with medication wears off in the afternoon.  Patient reports an increase in agitation, irritability, and anxiety.    Patient requested switching to a different stimulant medication.    I informed her I would send my recommendation over to her primary care provider and we would switch her to Adderall XR 10 mg    Thanks

## 2023-02-21 ENCOUNTER — TELEPHONE (OUTPATIENT)
Dept: BEHAVIORAL HEALTH | Facility: CLINIC | Age: 23
End: 2023-02-21
Payer: COMMERCIAL

## 2023-02-21 NOTE — TELEPHONE ENCOUNTER
"Called patient to check on her since switching her to medication from the methylphenidate to amphetamine salts.    Patient informed me that she had not been able to  the new prescription for Adderall.  Stating \"I plan to start more.\"    I instructed patient to contact me after a week or 2 of Adderall use to see how she responds and schedule follow-up as needed.    Thanks  "

## 2023-03-20 ENCOUNTER — TELEPHONE (OUTPATIENT)
Dept: FAMILY MEDICINE CLINIC | Facility: CLINIC | Age: 23
End: 2023-03-20
Payer: COMMERCIAL

## 2023-03-20 NOTE — TELEPHONE ENCOUNTER
Pt is tolerating Adderall fine. No side effects ect. She hasn't noticed any improvements with that dose. She wasn't sure if you wanted to increase it or go ahead w/ the Vyvanse?     Pt aware you're out of the office on Mondays.

## 2023-03-21 DIAGNOSIS — F90.0 ADHD (ATTENTION DEFICIT HYPERACTIVITY DISORDER), INATTENTIVE TYPE: Primary | ICD-10-CM

## 2023-03-21 RX ORDER — DEXTROAMPHETAMINE SACCHARATE, AMPHETAMINE ASPARTATE MONOHYDRATE, DEXTROAMPHETAMINE SULFATE AND AMPHETAMINE SULFATE 5; 5; 5; 5 MG/1; MG/1; MG/1; MG/1
20 CAPSULE, EXTENDED RELEASE ORAL EVERY MORNING
Qty: 30 CAPSULE | Refills: 0 | Status: SHIPPED | OUTPATIENT
Start: 2023-03-21

## 2023-03-21 NOTE — TELEPHONE ENCOUNTER
Call her to set up an appointment with me. I will increase her Adderall XR dose to 20 mg. We will hold off on Vyvanse at this time. Tell her to schedule a follow-up appointment when see has been on the new dose for 2-3 weeks.     Thanks

## 2023-03-21 NOTE — TELEPHONE ENCOUNTER
Pt informed adderall increased. Pt scheduled for 4/21 to make sure she has time to  medication and start it.

## 2023-04-12 NOTE — PROGRESS NOTES
Gynecologic Annual Exam Note        Gynecologic Exam and Menstrual Problem        Subjective     HPI  Savanah Keller is a 23 y.o.  female who presents for annual well woman exam as a new Patient reports problems with: painful periods, breakthrough bleeding. Patient's last menstrual period was 2023 (exact date).. Her periods occur every 25-35 days , lasting 5 days. The flow is moderate.. She reports dysmenorrhea is mild, occurring first 1-2 days of flow. Partner Status: Marital Status: single.  She has never been sexually active. STD testing recommendations have been explained to the patient and she does not desire STD testing.    Patient states her PCP placed her on Tri-Sprintec last year for painful periods for 4 months. She states this Birth Control only helped a little bit. She had her PCP switch her Birth Control in 2022 to Drospiren-Eth-Estrad-levomefol 3-0.02-0.45 mg. She states since being on this Pill almost everyday she has had breakthrough bleeding that is dark red with clots. It is heavy breakthrough bleeding that comes and goes. It occurs randomly. She states that this pill did help with acne.  She would like to discuss this issue today. Patient was referred to us by her PCP.     She is unsure if she has had the gardasil vaccines. She thinks she has but will check with her previous MD and see for sure.     Additional OB/GYN History   Current contraception: contraceptive methods: OCP (estrogen/progesterone)  Desires to: continue contraception  Thromboembolic Disease: none  Age of menarche: 12    History of STD: No     Last Pap : Per Patient has been 2 years ago and Normal not sure if HPV was done  Last Completed Pap Smear     This patient has no relevant Health Maintenance data.           History of abnormal Pap smear: no  Gardasil status:uncertain if she received the vaccine  Family history of uterine, colon, breast, or ovarian cancer: no  Performs monthly Self-Breast Exam:  yes  Exercises Regularly:yes  Feelings of Anxiety or Depression: Sometimes both patient states she has been going up the street for counseling for these issues   Tobacco Usage?: No       Current Outpatient Medications:   •  albuterol sulfate  (90 Base) MCG/ACT inhaler, Inhale 2 puffs Every 4 (Four) Hours As Needed for Wheezing or Shortness of Air., Disp: 8 g, Rfl: 5  •  amphetamine-dextroamphetamine XR (Adderall XR) 20 MG 24 hr capsule, Take 1 capsule by mouth Every Morning, Disp: 30 capsule, Rfl: 0  •  montelukast (SINGULAIR) 10 MG tablet, Take 1 tablet by mouth Every Night., Disp: 90 tablet, Rfl: 3  •  norethindrone-ethinyl estradiol FE (Junel FE ) 1-20 MG-MCG per tablet, Take 1 tablet by mouth Daily., Disp: 28 tablet, Rfl: 12     Patient denies the need for medication refills today.    OB History        0    Para   0    Term   0       0    AB   0    Living   0       SAB   0    IAB   0    Ectopic   0    Molar   0    Multiple   0    Live Births   0                Health Maintenance   Topic Date Due   • Annual Gynecologic Pelvic and Breast Exam  Never done   • HPV VACCINES (1 - 2-dose series) Never done   • TDAP/TD VACCINES (1 - Tdap) Never done   • HEPATITIS C SCREENING  Never done   • ANNUAL PHYSICAL  Never done   • CHLAMYDIA SCREENING  Never done   • PAP SMEAR  Never done   • COVID-19 Vaccine (4 - Booster for Moderna series) 2022   • INFLUENZA VACCINE  2023   • Pneumococcal Vaccine 0-64  Aged Out       Past Medical History:   Diagnosis Date   • Allergies    • Asthma         Past Surgical History:   Procedure Laterality Date   • WISDOM TOOTH EXTRACTION         The additional following portions of the patient's history were reviewed and updated as appropriate: allergies, current medications, past family history, past medical history, past social history, past surgical history and problem list.    Review of Systems   Constitutional: Negative.    Cardiovascular: Negative.   "  Gastrointestinal: Negative.    Genitourinary: Positive for menstrual problem ( BTB on current ocps).   Psychiatric/Behavioral: Negative.    All other systems reviewed and are negative.        I have reviewed and agree with the HPI, ROS, and historical information as entered above. Yola Gonzales, APRN        Objective   /70   Ht 172.7 cm (68\")   Wt 59.1 kg (130 lb 6.4 oz)   LMP 03/14/2023 (Exact Date)   BMI 19.83 kg/m²     Physical Exam  Vitals and nursing note reviewed. Exam conducted with a chaperone present.   Constitutional:       Appearance: She is well-developed.   HENT:      Head: Normocephalic and atraumatic.   Neck:      Thyroid: No thyroid mass or thyromegaly.   Cardiovascular:      Rate and Rhythm: Normal rate and regular rhythm.      Heart sounds: No murmur heard.  Pulmonary:      Effort: Pulmonary effort is normal. No retractions.      Breath sounds: Normal breath sounds. No wheezing, rhonchi or rales.   Chest:      Chest wall: No mass or tenderness.   Breasts:     Right: Normal. No mass, nipple discharge, skin change or tenderness.      Left: Normal. No mass, nipple discharge, skin change or tenderness.   Abdominal:      Palpations: Abdomen is soft. Abdomen is not rigid. There is no mass.      Tenderness: There is no abdominal tenderness. There is no guarding.      Hernia: No hernia is present.   Genitourinary:     General: Normal vulva.      Labia:         Right: No rash, tenderness or lesion.         Left: No rash, tenderness or lesion.       Vagina: Normal. No vaginal discharge or lesions.      Cervix: No cervical motion tenderness, discharge, lesion or cervical bleeding.      Uterus: Normal. Not enlarged, not fixed and not tender.       Adnexa: Right adnexa normal and left adnexa normal.        Right: No mass or tenderness.          Left: No mass or tenderness.        Rectum: Normal. No external hemorrhoid.   Musculoskeletal:      Cervical back: Normal range of motion. No muscular " tenderness.   Neurological:      Mental Status: She is alert and oriented to person, place, and time.   Psychiatric:         Behavior: Behavior normal.            Assessment and Plan    Problem List Items Addressed This Visit    None  Visit Diagnoses     Encounter for annual routine gynecological examination    -  Primary    Relevant Orders    LIQUID-BASED PAP SMEAR WITH HPV GENOTYPING IF ASCUS ([facility])    Encounter for initial prescription of contraceptive pills        Relevant Medications    norethindrone-ethinyl estradiol FE (Junel FE 1/20) 1-20 MG-MCG per tablet          1. GYN annual well woman exam.   2. She has been on SERGIO (generic) ocps for dysmenorrhea and has had BTB the past few months on this brand. Prior to this she was initially put on trisprintec and changed due to side effects. States SERGIO worked well for acne prevention but has daily BTB on this brand. Will change to Junel 1/20 when finishes current pill pack she is on.   3. Reviewed pap guidelines.   4. She has never been sexually active and declines the need for STD testing. Pap smear screening only today per pt. Request.  5. Discussed gardasil vaccines. She thinks she has had them in the past but is unsure. She will call previous MD to find out for sure. If she fines that she has not had them, recommended she call office to start vaccine series.   6. Reviewed monthly self breast exams.  Instructed to call with lumps, pain, or breast discharge.    7. Reviewed exercise as a preventative health measures.   8. Reccommended Flu Vaccine in Fall of each year.  9. RTC in 1 year or PRN with problems        Yola Gonzales, APRN  04/13/2023

## 2023-04-13 ENCOUNTER — OFFICE VISIT (OUTPATIENT)
Dept: OBSTETRICS AND GYNECOLOGY | Facility: CLINIC | Age: 23
End: 2023-04-13
Payer: COMMERCIAL

## 2023-04-13 VITALS
SYSTOLIC BLOOD PRESSURE: 112 MMHG | BODY MASS INDEX: 19.76 KG/M2 | DIASTOLIC BLOOD PRESSURE: 70 MMHG | WEIGHT: 130.4 LBS | HEIGHT: 68 IN

## 2023-04-13 DIAGNOSIS — Z30.011 ENCOUNTER FOR INITIAL PRESCRIPTION OF CONTRACEPTIVE PILLS: ICD-10-CM

## 2023-04-13 DIAGNOSIS — Z01.419 ENCOUNTER FOR ANNUAL ROUTINE GYNECOLOGICAL EXAMINATION: Primary | ICD-10-CM

## 2023-04-13 RX ORDER — NORETHINDRONE ACETATE AND ETHINYL ESTRADIOL 1MG-20(21)
1 KIT ORAL DAILY
Qty: 28 TABLET | Refills: 12 | Status: SHIPPED | OUTPATIENT
Start: 2023-04-13 | End: 2024-04-12

## 2023-04-14 LAB — REF LAB TEST METHOD: NORMAL

## 2023-05-05 ENCOUNTER — OFFICE VISIT (OUTPATIENT)
Dept: BEHAVIORAL HEALTH | Facility: CLINIC | Age: 23
End: 2023-05-05
Payer: COMMERCIAL

## 2023-05-05 VITALS — BODY MASS INDEX: 19.7 KG/M2 | HEIGHT: 68 IN | WEIGHT: 130 LBS

## 2023-05-05 DIAGNOSIS — F90.0 ADHD (ATTENTION DEFICIT HYPERACTIVITY DISORDER), INATTENTIVE TYPE: Primary | ICD-10-CM

## 2023-05-05 RX ORDER — SERDEXMETHYLPHENIDATE AND DEXMETHYLPHENIDATE 5.2; 26.1 MG/1; MG/1
1 CAPSULE ORAL DAILY
Qty: 30 CAPSULE | Refills: 0 | Status: SHIPPED | OUTPATIENT
Start: 2023-05-05

## 2023-05-05 NOTE — PROGRESS NOTES
"     Telehealth Visit      Patient Name: Savanah Keller  : 2000   MRN: [id number]     Referring Physician: Aleyda Sinha DO    Chief Complaint:      ICD-10-CM ICD-9-CM   1. ADHD (attention deficit hyperactivity disorder), inattentive type  F90.0 314.00        This provider is located at [facility], using a telephone in a secure private environment. The Patient is seen remotely at their home address in [place], using a private telephone.  The patient is unable to be seen through a MyChart Video Visit through [employer] at today's encounter because video was inoperable at this time, therefore a telephone encounter was conducted. Patient is being evaluated/treated via telehealth by telephone, and stated they are in a secure environment for this session. The patient's condition being diagnosed/treated is appropriate for telemedicine. The provider identified herself as well as her credentials.   The patient, and/or patient's guardian, consent to be seen remotely, and when consent is given they understand that the consent allows for patient identifiable information to be sent to a third party as needed.   They may refuse to be seen remotely at any time. The electronic data is encrypted and password protected, and the patient and/or guardian has been advised of the potential risks to privacy not withstanding such measures.    You have chosen to receive care through a telephone visit. Do you consent to use a telephone visit for your medical care today?  Yes  This visit has been rescheduled as a phone visit to comply with patient safety concerns in accordance with CDC recommendations.    History of Present Illness: Savanah Keller is a 23 y.o. female who I spoke with on the phone today for medication management of ADHD.  Patient reports since increasing Adderall XR to 20 mg she has seen some improvement in her focus and concentration.  However, patient states \"I feel like it is just " "too much, I am really amped up and actually more anxious.  I think it is making things worse now.\"  Patient affirms that her prior Concerta medication did not have the daily side effects.  However, she reported Concerta just did not last long enough and she would experience rebound anxiety once the medication wore off.  Patient reports some appetite suppression as well.      Subjective      Review of Systems:   The following portions of the patient's history were reviewed and updates as appropriate: allergies, current medications, past family history, past medical history, past social history, past surgical history and problem list.     Interval History:   No Change    Side Effects  Increased anxiety with Adderall increase.  Suppressed appetite.    Medications:     Current Outpatient Medications:   •  albuterol sulfate  (90 Base) MCG/ACT inhaler, Inhale 2 puffs Every 4 (Four) Hours As Needed for Wheezing or Shortness of Air., Disp: 8 g, Rfl: 5  •  montelukast (SINGULAIR) 10 MG tablet, Take 1 tablet by mouth Every Night., Disp: 90 tablet, Rfl: 3  •  norethindrone-ethinyl estradiol FE (Junel FE 1/20) 1-20 MG-MCG per tablet, Take 1 tablet by mouth Daily., Disp: 28 tablet, Rfl: 12  •  Serdexmethylphen-Dexmethylphen (azSTARys) 26.1-5.2 MG capsule, Take 1 dose by mouth Daily., Disp: 30 capsule, Rfl: 0        Objective     Physical Exam:  Vital Signs:   Due to extenuating circumstances and possible current health risks associated with the patient being present in a clinical setting (with current health restrictions in place in regards to possible COVID 19 transmission/exposure), the patient was seen remotely today via a telephone visit. Unable to obtain vital signs due to nature of remote visit.  Height stated at 68 inches.  Weight stated at 130 pounds.     Physical Exam  Neurological:      Mental Status: She is alert and oriented to person, place, and time. Mental status is at baseline.         Mental Status Exam: "   Hygiene:   Unable to assess  Cooperation:  Cooperative  Eye Contact:  Unable to assess  Psychomotor Behavior:  Unable to assess  Affect:  Appropriate  Mood: normal  Speech:  Normal  Thought Process:  Goal directed  Thought Content:  Mood congruent  Suicidal:  None  Homicidal:  None  Hallucinations:  None  Delusion:  None  Memory:  Intact  Orientation:  Grossly intact  Reliability:  good  Insight:  Fair  Judgement:  Good  Impulse Control:  Fair  Physical/Medical Issues:  No      Quality Measures:   Never smoker    I advised Savanah Keller of the risks of tobacco use.       Assessment / Plan      Assessment/Plan:   Diagnoses and all orders for this visit:    1. ADHD (attention deficit hyperactivity disorder), inattentive type (Primary)  -     Serdexmethylphen-Dexmethylphen (azSTARys) 26.1-5.2 MG capsule; Take 1 dose by mouth Daily.  Dispense: 30 capsule; Refill: 0     2.)  Discontinue Adderall XL.    TREATMENT PLAN/GOALS: Continue supportive psychotherapy efforts and medications as indicated. Treatment and medication options discussed during today's visit. Patient ackowledged and verbally consented to continue with current treatment plan and was educated on the importance of compliance with treatment and follow-up appointments. Patient seems reasonably able to adhere to treatment plan.      MEDICATION ISSUES:  INSPECT reviewed as expected  Discussed medication options and treatment plan of prescribed medication as well as the risks, benefits, and side effects including potential falls, possible impaired driving and metabolic adversities among others. Patient is agreeable to call the office with any worsening of symptoms or onset of side effects. Patient is agreeable to call 911 or go to the nearest Emergency Room should he/she begin having Suicidal Ideation or Homicidal Ideations. No medication side effects or related complaints today.     Follow Up:    Return in about 1 month (around 6/5/2023) for Recheck.    Call  time  Start: 1600  End: 1618    EDY Yañez, PMHNP-BC

## 2023-06-07 ENCOUNTER — OFFICE VISIT (OUTPATIENT)
Dept: FAMILY MEDICINE CLINIC | Facility: CLINIC | Age: 23
End: 2023-06-07
Payer: COMMERCIAL

## 2023-06-07 VITALS
DIASTOLIC BLOOD PRESSURE: 56 MMHG | WEIGHT: 133 LBS | SYSTOLIC BLOOD PRESSURE: 98 MMHG | OXYGEN SATURATION: 99 % | RESPIRATION RATE: 14 BRPM | BODY MASS INDEX: 20.16 KG/M2 | HEIGHT: 68 IN | TEMPERATURE: 98 F | HEART RATE: 72 BPM

## 2023-06-07 DIAGNOSIS — J30.2 SEASONAL ALLERGIES: ICD-10-CM

## 2023-06-07 DIAGNOSIS — Z00.00 ANNUAL PHYSICAL EXAM: Primary | ICD-10-CM

## 2023-06-07 DIAGNOSIS — Z23 NEED FOR TDAP VACCINATION: ICD-10-CM

## 2023-06-07 DIAGNOSIS — B00.1 FEVER BLISTER: ICD-10-CM

## 2023-06-07 DIAGNOSIS — J45.990 EXERCISE-INDUCED ASTHMA: ICD-10-CM

## 2023-06-07 DIAGNOSIS — Z23 NEED FOR HPV VACCINE: ICD-10-CM

## 2023-06-07 RX ORDER — ALBUTEROL SULFATE 90 UG/1
2 AEROSOL, METERED RESPIRATORY (INHALATION) EVERY 4 HOURS PRN
Qty: 8 G | Refills: 5 | Status: SHIPPED | OUTPATIENT
Start: 2023-06-07

## 2023-06-07 RX ORDER — MONTELUKAST SODIUM 10 MG/1
10 TABLET ORAL NIGHTLY
Qty: 90 TABLET | Refills: 3 | Status: SHIPPED | OUTPATIENT
Start: 2023-06-07

## 2023-06-07 RX ORDER — VALACYCLOVIR HYDROCHLORIDE 1 G/1
2000 TABLET, FILM COATED ORAL 2 TIMES DAILY
Qty: 4 TABLET | Refills: 5 | Status: SHIPPED | OUTPATIENT
Start: 2023-06-07 | End: 2023-06-08

## 2023-06-07 NOTE — PROGRESS NOTES
Chief Complaint  Annual Exam (No pap )    Subjective          Savanah Keller presents to [facility]  History of Present Illness  Dental exam is up to date  Vision exam is overdue  Pap smear: April 2023  She changed OCP recently due to prolonged period     She is seeing EDY Yañez for treatment of ADHD  At this time, she is not taking any medication for ADHD.     Needs refill of valtrex, takes as needed for cold sores.     The following portions of the patient's history were reviewed and updated as appropriate: allergies, current medications, past family history, past medical history, past social history, past surgical history, and problem list.    Objective      Physical Exam  Vitals reviewed.   HENT:      Right Ear: Tympanic membrane, ear canal and external ear normal.      Left Ear: Tympanic membrane, ear canal and external ear normal.   Cardiovascular:      Rate and Rhythm: Normal rate.      Heart sounds: Normal heart sounds.   Pulmonary:      Effort: Pulmonary effort is normal.      Breath sounds: Normal breath sounds.   Neurological:      Mental Status: She is alert.   Psychiatric:         Mood and Affect: Mood normal.         Behavior: Behavior normal.      Result Review :                Assessment and Plan    Diagnoses and all orders for this visit:    1. Annual physical exam (Primary)    2. Seasonal allergies  -     montelukast (SINGULAIR) 10 MG tablet; Take 1 tablet by mouth Every Night.  Dispense: 90 tablet; Refill: 3    3. Fever blister  -     valACYclovir (Valtrex) 1000 MG tablet; Take 2 tablets by mouth 2 (Two) Times a Day for 1 day.  Dispense: 4 tablet; Refill: 5    4. Exercise-induced asthma  -     albuterol sulfate  (90 Base) MCG/ACT inhaler; Inhale 2 puffs Every 4 (Four) Hours As Needed for Wheezing or Shortness of Air.  Dispense: 8 g; Refill: 5    5. Need for Tdap vaccination  -     Tdap Vaccine Greater Than or Equal To 6yo IM    6. Need for HPV  vaccine  -     HPV Vaccine (HPV9)    Health advice: healthy food choices with fresh fruits and vegetables, maintain sleep pattern at least 8 hours, avoid texting and distracted driving practices; wear safety belt, engage in regular exercise, maintain healthy weight, use safe sex practices, avoid alcohol and illicit drugs. Maintain immunizations that are up to date. Maintain health maintenance: Pap, etc.  Follow up with PCP if struggling with depression or anxiety. Keep regular dental and eye exams. Brush and floss teeth daily.   F/U annually and prn.       Follow Up   Return in about 1 year (around 6/7/2024) for Annual.  Patient was given instructions and counseling regarding her condition or for health maintenance advice. Please see specific information pulled into the AVS if appropriate.

## 2023-08-11 ENCOUNTER — TELEMEDICINE (OUTPATIENT)
Dept: BEHAVIORAL HEALTH | Facility: CLINIC | Age: 23
End: 2023-08-11
Payer: COMMERCIAL

## 2023-08-11 VITALS — BODY MASS INDEX: 20.16 KG/M2 | WEIGHT: 133 LBS | HEIGHT: 68 IN

## 2023-08-11 DIAGNOSIS — F90.0 ADHD (ATTENTION DEFICIT HYPERACTIVITY DISORDER), INATTENTIVE TYPE: Primary | ICD-10-CM

## 2023-08-11 RX ORDER — DEXTROAMPHETAMINE SULFATE 5 MG/1
5 CAPSULE, EXTENDED RELEASE ORAL DAILY
Qty: 30 CAPSULE | Refills: 0 | Status: SHIPPED | OUTPATIENT
Start: 2023-08-11 | End: 2024-08-10

## 2023-08-11 NOTE — PROGRESS NOTES
"     Video Visit      Patient Name: Savanah Keller  : 2000   MRN: [id number]     Referring Physician: Aleyad Sinha DO    Chief Complaint:      ICD-10-CM ICD-9-CM   1. ADHD (attention deficit hyperactivity disorder), inattentive type  F90.0 314.00        This provider is located at [facility], using a secure [employer] Video Visit through [employer]. Savanah Keller is being seen remotely via telehealth at their home address in [place], and stated they are in a secure environment for this session. The patient's condition being diagnosed/treated is appropriate for telemedicine. I EDY Fernández identified myself as well as my credentials.   The patient, and/or patients guardian, consent to be seen remotely, and when consent is given they understand that the consent allows for patient identifiable information to be sent to a third party as needed.   They may refuse to be seen remotely at any time. The electronic data is encrypted and password protected, and the patient and/or guardian has been advised of the potential risks to privacy not withstanding such measures.     You have chosen to receive care through a telehealth visit.  Do you consent to use a video/audio connection for your medical care today?  Yes  This visit complies with patient safety concerns in accordance with CDC recommendations.    History of Present Illness: Savanah Keller is a 23 y.o. female who I spoke with on video visit today for medication management of ADHD.  Patient reports that she has been making a lot of different life changes related to occupation which is why she missed her previous follow-up.  Patient reports that she took a new job in [place] which should be starting in September.  Patient reports a previous stimulant medication, Azstarys did not \"agree with me, I do not remember exactly what it was I just know it was not good for me.\"  Patient reports that she felt Adderall XR had given her " "the best symptom control with no noticeable adverse effects.  Her stimulant medication was switched related to availability of Adderall extended release.  Patient would like to explore other possible stealing medications within the same family to see if she can achieve good ADHD symptom control with minimal to no noticeable side effects.  Patient also requested an JOSS letter prior to her move to [place].      Subjective     Review of Systems:   Review of Systems   Constitutional: Negative.    Respiratory: Negative.     Cardiovascular: Negative.    Gastrointestinal: Negative.    Genitourinary: Negative.    Musculoskeletal: Negative.    Neurological: Negative.    Psychiatric/Behavioral:  Positive for decreased concentration. The patient is nervous/anxious.      Patient History:   The following portions of the patient's history were reviewed and updated as appropriate: allergies, current medications, past family history, past medical history, past social history, past surgical history and problem list.     Social:  Patient took a new job in [place] and will be moving next month.    Medications:     Current Outpatient Medications:     albuterol sulfate  (90 Base) MCG/ACT inhaler, Inhale 2 puffs Every 4 (Four) Hours As Needed for Wheezing or Shortness of Air., Disp: 8 g, Rfl: 5    dextroamphetamine (DEXEDRINE SPANSULE) 5 MG 24 hr capsule, Take 1 capsule by mouth Daily., Disp: 30 capsule, Rfl: 0    montelukast (SINGULAIR) 10 MG tablet, Take 1 tablet by mouth Every Night., Disp: 90 tablet, Rfl: 3    norethindrone-ethinyl estradiol FE (Junel FE 1/20) 1-20 MG-MCG per tablet, Take 1 tablet by mouth Daily., Disp: 28 tablet, Rfl: 12    Objective     Physical Exam:  Vital Signs:   Vitals:    08/11/23 1237   Weight: 60.3 kg (133 lb)   Height: 172.7 cm (68\")     Body mass index is 20.22 kg/mý.     Mental Status Exam:   Hygiene:   good  Cooperation:  Cooperative  Eye Contact:  Fair  Psychomotor Behavior:  Restless  Affect:  " Appropriate  Mood: normal  Speech:  Normal  Thought Process:  Goal directed  Thought Content:  Mood congruent  Suicidal:  None  Homicidal:  None  Hallucinations:  None  Delusion:  None  Memory:  Intact  Orientation:  Grossly intact  Reliability: Good  Insight:  Fair  Judgement:  Fair  Impulse Control:  Fair  Physical/Medical Issues:  No      Assessment / Plan      Visit Diagnosis/Orders Placed This Visit:  Diagnoses and all orders for this visit:    1. ADHD (attention deficit hyperactivity disorder), inattentive type (Primary)  -     dextroamphetamine (DEXEDRINE SPANSULE) 5 MG 24 hr capsule; Take 1 capsule by mouth Daily.  Dispense: 30 capsule; Refill: 0         Differential:   N/A    Plan:   Initiate Dexedrine 5 mg capsule daily.  Will send JOSS letter via [employer].  Instructed patient to contact me in a few weeks after stimulant initiation if side effects were experienced or if a dose increase is indicated.    Continue supportive psychotherapy efforts and medications as indicated. Treatment and medication options discussed during today's visit. Patient ackowledged and verbally consented to continue with current treatment plan and was educated on the importance of compliance with treatment and follow-up appointments. Patient seems reasonably able to adhere to treatment plan.      Medication Considerations:  Discussed medication options and treatment plan of prescribed medication(s) as well as the risks, benefits, and potential side effects. Patient is agreeable to call the office with any worsening of symptoms or onset of side effects. Patient is agreeable to call 911 or go to the nearest ER should he/she begin having SI/HI.    Quality Measures:   Never smoker    I advised Savanah Keller of the risks of tobacco use.     Follow Up:   Return in about 1 month (around 9/11/2023) for Recheck.      EDY Yañez, PMHNP-BC

## 2023-08-11 NOTE — LETTER
August 11, 2023      [facility]  [address]  [place] [address]  [phone]          Patient: Savanah Keller   YOB: 2000   Date of Visit: 8/11/2023       To Whom It May Concern:    Savanah Alfaro is my patient, and is currently under my care for behavioral health treatment.  She meets the definition of disability under the Americans With Disabilities Act, the fair housing act, and the rehabilitation act of 1973.    Due to her ADHD and associated anxiety, she has certain limitations as related to her anxiety in social and overstimulating situations.  In order to manage and help minimize these symptoms, Savanah Alfaro is in the need of her therapy support animal. Her emotional support animal will help alleviate her anxiety symptoms and will assist her in managing her anxiety and coping.     Please feel free to contact me with any questions or concerns.           Sincerely,        Quentin Bruner, MSN, APRN, PMHNP-BC

## 2025-01-14 ENCOUNTER — TELEPHONE (OUTPATIENT)
Dept: FAMILY MEDICINE CLINIC | Facility: CLINIC | Age: 25
End: 2025-01-14
Payer: COMMERCIAL

## 2025-01-14 NOTE — LETTER
[facility]  [address]  [place] [address]  [phone]  Dept: [phone]    25    RE:   Patient Name:  Savanah Keller   :  2000      To Whom It May Concern,    Savanah is my patient, and has been under my care since 2022.  I am familiar with her history and with the functional limitations imposed by her disability.  She meets the definition of disability under the Americans with Disabilities Act, the Fair Housing Act, and the Rehabilitation Act of 1973.     Due to mental illness, Savanah has certain limitations regarding social interactions, stress management, and medication of anxiety associated with ADHD.  In order to help alleviate these difficulties, and to enhance her ability to live independently and to fully use and enjoy the dwelling unit you own and/or administer, I am prescribing an emotional support animal, her cat Dai, that will assist Savanah in coping with her disability.    I would be happy to answer other questions you may have concerning my recommendation that Savanah have an emotional support animal.  Should you have additional questions, please do not hesitate to contact me.    Sincerely,      Quentin Bruner, MSN, APRN, PMHNP-BC

## 2025-01-14 NOTE — TELEPHONE ENCOUNTER
Savanah called in wanting to know if you would be able to write her a letter for JOSS. She stated that the letter needed to state that her animal is a cat named Marsha

## 2025-05-15 ENCOUNTER — OFFICE VISIT (OUTPATIENT)
Dept: FAMILY MEDICINE CLINIC | Facility: CLINIC | Age: 25
End: 2025-05-15
Payer: COMMERCIAL

## 2025-05-15 VITALS
RESPIRATION RATE: 16 BRPM | HEART RATE: 78 BPM | DIASTOLIC BLOOD PRESSURE: 70 MMHG | WEIGHT: 129.8 LBS | HEIGHT: 68 IN | OXYGEN SATURATION: 97 % | SYSTOLIC BLOOD PRESSURE: 108 MMHG | BODY MASS INDEX: 19.67 KG/M2 | TEMPERATURE: 97.3 F

## 2025-05-15 DIAGNOSIS — Z30.41 ENCOUNTER FOR BIRTH CONTROL PILLS MAINTENANCE: ICD-10-CM

## 2025-05-15 DIAGNOSIS — J45.990 EXERCISE-INDUCED ASTHMA: Primary | ICD-10-CM

## 2025-05-15 LAB
B-HCG UR QL: NEGATIVE
EXPIRATION DATE: NORMAL
INTERNAL NEGATIVE CONTROL: NORMAL
INTERNAL POSITIVE CONTROL: NORMAL
Lab: NORMAL

## 2025-05-15 PROCEDURE — 99213 OFFICE O/P EST LOW 20 MIN: CPT | Performed by: FAMILY MEDICINE

## 2025-05-15 PROCEDURE — 81025 URINE PREGNANCY TEST: CPT | Performed by: FAMILY MEDICINE

## 2025-05-15 RX ORDER — DROSPIRENONE AND ETHINYL ESTRADIOL 0.03MG-3MG
1 KIT ORAL DAILY
Qty: 84 TABLET | Refills: 3 | Status: SHIPPED | OUTPATIENT
Start: 2025-05-15

## 2025-05-15 RX ORDER — ALBUTEROL SULFATE 90 UG/1
2 INHALANT RESPIRATORY (INHALATION) EVERY 4 HOURS PRN
Qty: 8 G | Refills: 5 | Status: SHIPPED | OUTPATIENT
Start: 2025-05-15

## 2025-05-15 RX ORDER — DROSPIRENONE AND ETHINYL ESTRADIOL 0.03MG-3MG
1 KIT ORAL DAILY
COMMUNITY
End: 2025-05-15 | Stop reason: SDUPTHER

## 2025-05-15 NOTE — PROGRESS NOTES
Chief Complaint  Med Refill (Birth control refill)    Subjective          Savanah Keller presents to [facility]    History of Present Illness  The patient presents for a medication refill and to discuss her acne management.    She has been taking Margaret, is currently in the last week of her cycle. Her most recent menstrual period started on 05/13/2025. She is sexually active with a female partner and does not take the placebo pills during her cycle. She uses birth control primarily for period regulation and acne management.  She is getting  soon.    She reports that her skin tends to clear up during her menstrual period. A physician in [place] prescribed a retinoid cream, which she has been applying topically to manage her acne.    Additionally, she has recently resumed playing basketball and requests a refill of her albuterol inhaler.    GYNECOLOGICAL HISTORY:  - Last Menstrual Period: 05/13/2025      The following portions of the patient's history were reviewed and updated as appropriate: allergies, current medications, past family history, past medical history, past social history, past surgical history, and problem list.    Objective      Physical Exam  Vitals and nursing note reviewed.   Constitutional:       Appearance: Normal appearance.   Cardiovascular:      Rate and Rhythm: Normal rate and regular rhythm.      Heart sounds: Normal heart sounds.   Pulmonary:      Effort: Pulmonary effort is normal.      Breath sounds: Normal breath sounds.   Neurological:      Mental Status: She is alert.        Physical Exam  Respiratory: Clear to auscultation, no wheezing, rales or rhonchi    Result Review :       Results  Labs   - Pregnancy test: Negative             Assessment and Plan    Diagnoses and all orders for this visit:    1. Exercise-induced asthma (Primary)  -     albuterol sulfate  (90 Base) MCG/ACT inhaler; Inhale 2 puffs Every 4 (Four) Hours As Needed for  Wheezing or Shortness of Air.  Dispense: 8 g; Refill: 5    2. Encounter for birth control pills maintenance  -     POC Pregnancy, Urine  -     drospirenone-ethinyl estradiol (Margaret) 3-0.03 MG per tablet; Take 1 tablet by mouth Daily.  Dispense: 84 tablet; Refill: 3      Assessment & Plan  1. Medication refill.  - OCP sent to pharmacy   - Negative UPT  - Last menstrual cycle started on 05/13/2025.    2. Asthma.  - Prescription refill for the albuterol inhaler will be sent to pharmacy.    3. Health maintenance.  - Last Pap smear conducted in 04/2023, due for another one in 04/2024.  - Advised to schedule next Pap smear next year.  - Pregnancy test was negative.  - Normal blood pressure and vitals noted.    Follow-up  The patient will follow up in 1 year for an annual exam.        Follow Up   Return in about 1 year (around 5/15/2026) for Annual physical.    Patient or patient representative verbalized consent for the use of Ambient Listening during the visit with  Aleyda Sinha DO for chart documentation. 5/15/2025  14:02 EDT  Patient was given instructions and counseling regarding her condition or for health maintenance advice. Please see specific information pulled into the AVS if appropriate.

## 2025-05-15 NOTE — PROGRESS NOTES
Chief Complaint  Med Refill (Birth control refill)    Subjective     {Problem List  Visit Diagnosis   Encounters  Notes  Medications  Labs  Result Review Imaging  Media :23}     Savanah Keller presents to [facility]  History of Present Illness    LMP: 5/13/25    The following portions of the patient's history were reviewed and updated as appropriate: allergies, current medications, past family history, past medical history, past social history, past surgical history, and problem list.    Objective      Physical Exam  Vitals and nursing note reviewed.   Cardiovascular:      Rate and Rhythm: Normal rate and regular rhythm.      Heart sounds: Normal heart sounds.   Pulmonary:      Effort: Pulmonary effort is normal.      Breath sounds: Normal breath sounds.   Neurological:      Mental Status: She is alert.   Psychiatric:         Mood and Affect: Mood normal.        Result Review :{Labs  Result Review  Imaging  Med Tab  Media :23}   {The following data was reviewed by (Optional):62069}  {Ambulatory Labs (Optional):24182}           Assessment and Plan {CC Problem List  Visit Diagnosis  ROS  Review (Popup)  Health Maintenance  Quality  BestPractice  Medications  SmartSets  SnapShot Encounters  Media :23}   Diagnoses and all orders for this visit:    1. Exercise-induced asthma (Primary)  -     albuterol sulfate  (90 Base) MCG/ACT inhaler; Inhale 2 puffs Every 4 (Four) Hours As Needed for Wheezing or Shortness of Air.  Dispense: 8 g; Refill: 5    2. Encounter for birth control pills maintenance  -     POC Pregnancy, Urine  -     drospirenone-ethinyl estradiol (Margaret) 3-0.03 MG per tablet; Take 1 tablet by mouth Daily.  Dispense: 84 tablet; Refill: 3      {Time Spent (Optional):21290}  Follow Up {Instructions Charge Capture  Follow-up Communications :23}  Return in about 1 year (around 5/15/2026) for Annual physical.  Patient was given instructions and  counseling regarding her condition or for health maintenance advice. Please see specific information pulled into the AVS if appropriate.

## 2025-06-25 ENCOUNTER — TELEPHONE (OUTPATIENT)
Dept: FAMILY MEDICINE CLINIC | Facility: CLINIC | Age: 25
End: 2025-06-25
Payer: COMMERCIAL

## 2025-06-25 NOTE — TELEPHONE ENCOUNTER
Caller: Savanah Keller    Relationship: Self    Best call back number: [phone]     What medication are you requesting: VALCYCLOVIR    Have you had these symptoms before:    [] Yes  [x] No    Have you been treated for these symptoms before:   [] Yes  [x] No    If a prescription is needed, what is your preferred pharmacy and phone number: [facility] [id number] [address] [phone] [facility] [phone]      Additional notes: PATIENT IS GOING ON HER HONEYMOON. SHE WILL BE IN THE SUN AND GETS FEVER BLISTERS IN THE SUN SOMETIMES. SHE WOULD LIKE THIS JUST IN CASE.

## 2025-06-29 RX ORDER — VALACYCLOVIR HYDROCHLORIDE 1 G/1
2000 TABLET, FILM COATED ORAL 2 TIMES DAILY
Qty: 4 TABLET | Refills: 5 | Status: SHIPPED | OUTPATIENT
Start: 2025-06-29 | End: 2025-06-30